# Patient Record
Sex: FEMALE | Race: WHITE | NOT HISPANIC OR LATINO | URBAN - METROPOLITAN AREA
[De-identification: names, ages, dates, MRNs, and addresses within clinical notes are randomized per-mention and may not be internally consistent; named-entity substitution may affect disease eponyms.]

---

## 2017-08-02 ENCOUNTER — TRANSCRIBE ORDERS (OUTPATIENT)
Dept: ADMINISTRATIVE | Facility: HOSPITAL | Age: 62
End: 2017-08-02

## 2017-08-02 DIAGNOSIS — E83.119 DILATED CARDIOMYOPATHY SECONDARY TO HEMOCHROMATOSIS (HCC): Primary | ICD-10-CM

## 2017-08-02 DIAGNOSIS — I43 DILATED CARDIOMYOPATHY SECONDARY TO HEMOCHROMATOSIS (HCC): Primary | ICD-10-CM

## 2017-08-02 DIAGNOSIS — Z80.0 FAMILY HISTORY OF MALIGNANT NEOPLASM OF GASTROINTESTINAL TRACT: ICD-10-CM

## 2017-08-09 ENCOUNTER — HOSPITAL ENCOUNTER (OUTPATIENT)
Dept: RADIOLOGY | Facility: HOSPITAL | Age: 62
Discharge: HOME | End: 2017-08-09
Payer: COMMERCIAL

## 2017-08-09 DIAGNOSIS — I43 DILATED CARDIOMYOPATHY SECONDARY TO HEMOCHROMATOSIS (HCC): ICD-10-CM

## 2017-08-09 DIAGNOSIS — Z80.0 FAMILY HISTORY OF MALIGNANT NEOPLASM OF GASTROINTESTINAL TRACT: ICD-10-CM

## 2017-08-09 DIAGNOSIS — E83.119 DILATED CARDIOMYOPATHY SECONDARY TO HEMOCHROMATOSIS (HCC): ICD-10-CM

## 2017-08-09 PROCEDURE — 76700 US EXAM ABDOM COMPLETE: CPT

## 2022-08-10 ENCOUNTER — TELEPHONE (OUTPATIENT)
Dept: PSYCHIATRY | Facility: CLINIC | Age: 67
End: 2022-08-10

## 2022-08-10 NOTE — TELEPHONE ENCOUNTER
Behavorial Health Outpatient Intake Questions    Referred by: family       Please advised interviewee that they need to answer all questions truthfully to allow for best care and any misrepresentations of information may affect their ability to be seen at this clinic   => Was this discussed? Yes     [facility]     Presenting Problem (in patient's words):   Medication concerns     Are there any developmental disabilities? ? If yes, can they speak to you on the phone? If they are too limited to speak to you on phone, refer out No    Are you taking any psychiatric medications? Yes    => If yes, who prescribes? If yes, are they injectable medications?   citalopam ativan =Sarah Hayes     Does the patient have a language barrier or hearing impairment? No    Have you been treated at [facility] by a therapist or a doctor in the past? If yes, who? No    Has the patient been hospitalized for mental health? No   If yes, how long ago was last hospitalization and where was it? Do you actively use alcohol or marijuana or illegal substances? If yes, what and how much - refer out to Drug and alcohol treatment if use is excessive or daily use of illegal substances No concerns of substance abuse are reported  Do you have a community treatment team or ? No    Legal History-     Does the patient have any history of arrests, half-way/nursing home time, or DUIs? No  If Yes-  1) What types of charges? 2) When were they last incarcerated? 3) Are they currently on parole or probation? Minor Child-    Who has custody of the child? Is there a custody agreement? If there is a custody agreement remind parent that they must bring a copy to the first appt or they will not be seen       Intake Team, please check with provider before scheduling if flags come up such as:  - complex case  - legal history (other than DUI)  - communication barrier concerns are present  - if, in your judgment, this needs further review    ACCEPTED as a patient Yes  => Appointment Date: 08/1/22 @ 3     Referred Elsewhere? No    Name of [address] Phone #  If ins is primary or secondary  If patient is a minor, parents information such as Name, D  O B of guarantor    Mariajose Supplement   ID# [id number]

## 2022-08-11 ENCOUNTER — OFFICE VISIT (OUTPATIENT)
Dept: PSYCHIATRY | Facility: CLINIC | Age: 67
End: 2022-08-11
Payer: MEDICARE

## 2022-08-11 DIAGNOSIS — F33.1 MAJOR DEPRESSIVE DISORDER, RECURRENT, MODERATE (HCC): Primary | ICD-10-CM

## 2022-08-11 DIAGNOSIS — F41.1 GENERALIZED ANXIETY DISORDER WITH PANIC ATTACKS: ICD-10-CM

## 2022-08-11 DIAGNOSIS — F41.0 GENERALIZED ANXIETY DISORDER WITH PANIC ATTACKS: ICD-10-CM

## 2022-08-11 PROCEDURE — 90792 PSYCH DIAG EVAL W/MED SRVCS: CPT | Performed by: PSYCHIATRY & NEUROLOGY

## 2022-08-11 RX ORDER — LORAZEPAM 0.5 MG/1
0.5 TABLET ORAL 2 TIMES DAILY PRN
COMMUNITY
Start: 2022-06-28 | End: 2022-09-07 | Stop reason: ALTCHOICE

## 2022-08-11 RX ORDER — BUSPIRONE HYDROCHLORIDE 5 MG/1
TABLET ORAL
Qty: 106 TABLET | Refills: 0 | Status: SHIPPED | OUTPATIENT
Start: 2022-08-11 | End: 2022-08-30 | Stop reason: SDUPTHER

## 2022-08-11 RX ORDER — CITALOPRAM 20 MG/1
40 TABLET ORAL DAILY
COMMUNITY
Start: 2022-08-09 | End: 2022-09-02 | Stop reason: SDUPTHER

## 2022-08-11 NOTE — BH TREATMENT PLAN
TREATMENT PLAN (Medication Management Only)        [address] [place] [address]    Name and Date of Birth: Sreekanth Monk SFXU 42 y o  1955  Date of Treatment Plan: August 11, 2022  Diagnosis/Diagnoses:    1  Major depressive disorder, recurrent, moderate (HCC)    2  Generalized anxiety disorder with panic attacks      Strengths/Personal Resources for Self-Care: supportive family, taking medications as prescribed, ability to communicate needs, ability to listen, ability to understand psychiatric illness  Area/Areas of need (in own words): anxiety, depression  1  Long Term Goal: to be off the medications  Target Date:3 months - 11/11/2022  Person/Persons responsible for completion of goal: Adilia  2  Short Term Objective (s) - How will we reach this goal?:   A  Provider new recommended medication/dosage changes and/or continue medication(s): continue current medications as prescribed  B  N/A   C  N/A  Target Date:3 months - 11/11/2022  Person/Persons Responsible for Completion of Goal: Adilia  Progress Towards Goals: starting treatment  Treatment Modality: medication management every 4 weeks  Review due 180 days from date of this plan: 6 months - 2/11/2023  Expected length of service: ongoing treatment  My Physician/PA/NP and I have developed this plan together and I agree to work on the goals and objectives  I understand the treatment goals that were developed for my treatment

## 2022-08-11 NOTE — PSYCH
[address] [facility]    Name and Date of Birth: Elijah Dang JXNP 70 y o  1955 MRN: [id number]    Date of Visit: August 11, 2022    Source of Information:  Patient herself who seems to be okay historian  Her medical records in the [facility] was also reviewed  Chief Complaint:  Anxiety and depression    HPI:  This is a 71-year-old  female, , has 3 grownup children, currently lives with her  in [place], [place]  The patient currently works at a gift store as a  in [place], [place]  The patient reports she 1st saw a psychiatrist when she was 44-year-old for depression after her 3month-old son passed away  She reports she has followed with few psychiatrist on and off till now  She reports her last psychiatric provider was Florence Stark who is a physician assistant at [facility]  She reports she followed with her for almost 10 years  The patient reports she had been diagnosed with anxiety and depression in the past   She also reported being in counseling in the past but not nowadays  She reports she had recurrent episodes of depression and anxiety in the past   She reports though her anxiety since last 5-6 months has been getting worse  She reports being on Celexa for many years and was very effective but since early this year she feels it affects is weaning off  She reports her previous provider cross tapered her on Paxil recently but she felt very nauseated on it and stop taking Paxil  Currently takes Celexa 40 mg daily  She also is on Ativan 0 5 mg up to 2 times a day as needed and was started few months back  She reports in the past she had been on Wellbutrin though it was not effective  She reports she was also recommended to consider Abilify which she never started taking    She was advised to take nortriptyline recently after she felt nauseated on the Paxil but never started it also  Patient connected with us for her medication management and came in today for her initial psychiatric evaluation  As mentioned above the patient reports she has been feeling very anxious over last 5-6 months  She though denies any acute stressor but reports her mom is 78-year-old and needs lot of help which can be stressful at times  She also reported her daughter's  was diagnosed with lymphoma early this year though he has been doing better now  She denies though any other stress  She reports she is not sure why she has been so anxious lately  Reports her anxiety is worse in the morning and as the day progresses it gets better  Reports taking Ativan in the morning after she wakes up and 2nd dose in the evening  She described her anxiety as feeling like losing control over things, not feeling calm, feeling afraid of something but could not pinpoint  She reports occasional panic attack over last few months including feeling shaky, racing heart and difficulty breathing  Denies any social anxiety but does not like to be around people  The patient reports she has struggle with anxiety in the past but had been mostly situational and had not last so long  Reports Celexa was very effective for anxiety in the past but not nowadays  The patient denies any history of abuse or trauma ever in her life except for losing child  The patient also reports she has been feeling very sad over last few months but feels it is due to underlying anxiety  Reports feeling hopeless and thinking that anxiety would never get better  Reports still working but have poor motivation  Denies any suicidal thoughts or any thoughts or urges to harm others  Denies any history of any suicide attempt ever in the past   Reports sleep has been overall good  Though reports poor appetite and energy level  Reports focus also has been very poor  Endorses anhedonia  Reports not able to enjoy anything nowadays  Reports good support from her  and children  Reports had similar episodes in the past couple of times last time was in 2011 after her daughter moved away to different state  She though reports it only lasted for few months  Denies any history of auditory or visual hallucination  Does not endorse any paranoia, delusional or grandiose ideation  Denies any history of any manic or hypomanic episode ever in the past   Denies any history of eating disorder  Review Of Systems:    Constitutional negative   ENT negative   Cardiovascular negative   Respiratory negative   Gastrointestinal negative   Genitourinary negative   Musculoskeletal negative   Integumentary negative   Neurological negative   Endocrine negative   Other Symptoms none       Past Psychiatric History:  Check HPI for detail  Traumatic History:     Abuse: none  Other Traumatic Events: none       Substance Abuse History:  The patient denies any alcohol or substance use  Reports used to drink alcohol occasionally but not nowadays  Longest clean time: not applicable  History of Inpatient/Outpatient rehabilitation program: no  Smoking history: denies use  Use of caffeine: 1/2 cups of coffee per day    Family Psychiatric/Substance Use History:     Psychiatric Illness:   Mother - depression and anxiety disorder, Sister - depression and anxiety disorder, Sister - depression and anxiety disorder  Substance Abuse:  patient denies  Suicide Attempts:  patient denies    Social History:  Born & Raised in :  [place]  Childhood Experiences:  Good  Education: bachelor's degree  Learning Disabilities: none  Marital History:   Children: 3 adult children  Living Arrangement: lives in home with   Occupational History: works at [address] [employer]: good support system  Legal History: none   History: None      Past Medical History:    Past Medical History:   Diagnosis Date    Hemochromatosis History reviewed  No pertinent surgical history  Allergies   Allergen Reactions    Sulfa Antibiotics Hives and Other (See Comments)     hives    Ciprofloxacin Rash     Other reaction(s): rash  Other reaction(s): rash           Current Medications:      Current Outpatient Medications:     busPIRone (BUSPAR) 5 mg tablet, Take 1 tablet (5 mg total) by mouth 3 (three) times a day for 14 days, THEN 2 tablets (10 mg total) 2 (two) times a day for 16 days  , Disp: 106 tablet, Rfl: 0    Cholecalciferol 25 MCG (1000 UT) capsule, Take 5,000 Units by mouth daily, Disp: , Rfl:     citalopram (CeleXA) 20 mg tablet, Take 40 mg by mouth in the morning, Disp: , Rfl:     LORazepam (ATIVAN) 0 5 mg tablet, Take 0 5 mg by mouth 2 (two) times a day as needed, Disp: , Rfl:        OBJECTIVE:    Vital signs in last 24 hours: There were no vitals filed for this visit      Mental Status Evaluation:    Appearance age appropriate, casually dressed   Behavior cooperative, calm   Speech normal rate, normal volume, normal pitch   Mood depressed, anxious   Affect tearful, mood-congruent   Thought Processes organized, goal directed   Associations intact associations   Thought Content no overt delusions   Perceptual Disturbances: no auditory hallucinations, no visual hallucinations   Abnormal Thoughts  Risk Potential Suicidal ideation - None  Homicidal ideation - None  Potential for aggression - No   Orientation oriented to person, place, time/date and situation   Memory recent and remote memory grossly intact   Consciousness alert and awake   Attention Span Concentration Span attention span and concentration are age appropriate   Intellect appears to be of average intelligence   Insight intact   Judgement intact   Muscle Strength and  Gait normal gait and normal balance   Motor Activity no abnormal movements   Language no difficulty naming common objects, no difficulty repeating a phrase   Fund of Knowledge adequate knowledge of current events  adequate fund of knowledge regarding past history  adequate fund of knowledge regarding vocabulary    Pain none   Pain Scale 0       Laboratory Results: Most Recent Labs:  Reviewed in epic    Assessment/Plan:  From evaluation of the patient today and review of her past psychiatric history, I believe patient meets the criteria for major depressive disorder, recurrent currently moderate and generalized anxiety disorder with panic attack  It seems patient struggle significantly with anxiety which led to her feeling depressed  The patient does not endorse any symptoms or meets criteria for any other mental health disorder at this time  No alcohol or substance abuse history  Has a very good support from her family  Is also future oriented  Though feeling currently hopeless due to her significant anxiety  No SI or HI neither has any history of any suicide attempt ever in the past       Plan:  Reviewed with the patient various pharmacological and nonpharmacological treatment options  I reviewed with patient considering either adding Abilify or BuSpar to Celexa or switching Celexa to SNRI such as Effexor for management of anxiety and depression  The patient is concerned about being on Abilify as she feels it might decrease her white blood cell count  The patient last blood work count showed normal WBC though it was below normal range in the past   The patient agrees to takes BuSpar  I will start the patient on BuSpar 5 mg 3 times a day for next 2 weeks and then increasing to 10 mg twice a day till she sees me again in 4 weeks  I will continue with Celexa 40 mg 1 tablet daily with no changes for anxiety and depression  The patient has been advised to take Ativan 0 5 mg only 1 tablet a day if needed for severe anxiety or panic attack otherwise to avoid given the long-term risk of the benzodiazepines    The patient educated about her medications in detail including benefit, risk, side effects, alternative, contraindication, dosage and frequency  She was specially educated about risk of serotonin syndrome and its symptoms and advised to call me if there is any concern or to call crisis or visit nearby ER  She also was advised to call crisis or visit nearby ER in case of worsening depression or having any SI or HI or any emergency  The patient agrees  I will also send a referral to connect the patient with psychotherapist   The patient was in agreement with the plan  Diagnoses and all orders for this visit:    Major depressive disorder, recurrent, moderate (HCC)    Generalized anxiety disorder with panic attacks  -     busPIRone (BUSPAR) 5 mg tablet; Take 1 tablet (5 mg total) by mouth 3 (three) times a day for 14 days, THEN 2 tablets (10 mg total) 2 (two) times a day for 16 days  Other orders  -     Cholecalciferol 25 MCG (1000 UT) capsule; Take 5,000 Units by mouth daily  -     citalopram (CeleXA) 20 mg tablet; Take 40 mg by mouth in the morning  -     LORazepam (ATIVAN) 0 5 mg tablet; Take 0 5 mg by mouth 2 (two) times a day as needed          Treatment Recommendations:    Check Assessment/Plan section for details  Risks/Benefits/Precautions:      Risks, Benefits And Possible Side Effects Of Medications:    Risks, benefits, and possible side effects of medications explained to Dank Gallo and she verbalizes understanding and agreement for treatment  Controlled Medication Discussion:     Dank Gallo has been filling controlled prescriptions on time as prescribed according to [facility]  Discussed with Dank Sabino the risks of sedation, respiratory depression, impairment of ability to drive and potential for abuse and addiction related to treatment with benzodiazepine medications   She understands risk of treatment with benzodiazepine medications, agrees to not drive if feels impaired and agrees to take medications as prescribed  Discussed with Charles Humphrey warning on concurrent use of benzodiazepines and opioid medications including sedation, respiratory depression, coma and death  She understands the risk of treatment with benzodiazepines in addition to opioids and wants to continue taking those medications  Discussed with Bharti Wilkerson increased risk of impairment related to concurrent use of benzodiazepines and hypnotic medications including excessive sedation, psychomotor impairment and respiratory depression   She understands the risk of treatment with benzodiazepines in addition to hypnotic medications and wants to continue taking those medications    Treatment Plan;    Completed and signed during the session: Yes - with Dustin Astudillo MD 08/11/22

## 2022-08-18 ENCOUNTER — TELEPHONE (OUTPATIENT)
Dept: PSYCHIATRY | Facility: CLINIC | Age: 67
End: 2022-08-18

## 2022-08-18 NOTE — TELEPHONE ENCOUNTER
Returned Adilia's call  Hildaey Gottron is not having any side effects from the Buspar  Tomorrow will be a week that she is on 5 MG TID  She wants to know if she has to wait the full 2 weeks for the increase or can she increase now? Will refer to Dr Alee Guevara for review

## 2022-08-30 ENCOUNTER — TELEPHONE (OUTPATIENT)
Dept: PSYCHIATRY | Facility: CLINIC | Age: 67
End: 2022-08-30

## 2022-08-30 DIAGNOSIS — F41.0 GENERALIZED ANXIETY DISORDER WITH PANIC ATTACKS: ICD-10-CM

## 2022-08-30 DIAGNOSIS — F41.1 GENERALIZED ANXIETY DISORDER WITH PANIC ATTACKS: ICD-10-CM

## 2022-08-30 RX ORDER — BUSPIRONE HYDROCHLORIDE 10 MG/1
10 TABLET ORAL 3 TIMES DAILY
Qty: 90 TABLET | Refills: 0 | Status: SHIPPED | OUTPATIENT
Start: 2022-08-30 | End: 2022-09-26 | Stop reason: SDUPTHER

## 2022-08-30 NOTE — TELEPHONE ENCOUNTER
Returned Adilia's call  She is not feeling any better  Anxiety is still very high and she doesn't even want to get out of bed in the morning because of it  She is currently taking 10 MG of Buspar twice a day and does not feel it is doing anything  Also questioned the referral for talk therapy  Has not heard anything yet  Is requesting providers recommendation  Will refer to Dr Wilfredo Bansal for review

## 2022-08-30 NOTE — TELEPHONE ENCOUNTER
Spoke to Reji Anderson  Instructed her to allow Buspar more time to work but informed her the Dr Christian Fletcher increased her dose to 10 MG three times a day  She will finish off her 5 mg tablets by taking 2 TID and will discuss further with provider at upcoming appointment on 9/8

## 2022-08-30 NOTE — PROGRESS NOTES
The patient called today and complained of significant anxiety and feels current dose of BuSpar not helpful  The medication was just started 20 days back and can take some time but I will increase the dose of BuSpar to 10 milligram 1 tablet 3 times a day for management of anxiety  Her other meds will be continued with no change at this time but on follow-up visit next week will consider other alternative to Celexa if the patient still does not feel well  Patient was updated about the plan through nurse [facility]  Patient was made aware of benefit versus side effect, risk and other alternative at last visit  She was specially educated about serotonin syndrome at that time and its symptoms and was advised to call us if there is any concern or to call crisis or visit nearby ER

## 2022-09-02 DIAGNOSIS — F33.1 MAJOR DEPRESSIVE DISORDER, RECURRENT, MODERATE (HCC): ICD-10-CM

## 2022-09-02 DIAGNOSIS — F41.0 GENERALIZED ANXIETY DISORDER WITH PANIC ATTACKS: Primary | ICD-10-CM

## 2022-09-02 DIAGNOSIS — F41.1 GENERALIZED ANXIETY DISORDER WITH PANIC ATTACKS: Primary | ICD-10-CM

## 2022-09-02 RX ORDER — CITALOPRAM 20 MG/1
40 TABLET ORAL DAILY
Qty: 60 TABLET | Refills: 0 | Status: SHIPPED | OUTPATIENT
Start: 2022-09-02 | End: 2022-09-26 | Stop reason: SDUPTHER

## 2022-09-07 ENCOUNTER — TELEMEDICINE (OUTPATIENT)
Dept: PSYCHIATRY | Facility: CLINIC | Age: 67
End: 2022-09-07
Payer: MEDICARE

## 2022-09-07 ENCOUNTER — TELEPHONE (OUTPATIENT)
Dept: PSYCHIATRY | Facility: CLINIC | Age: 67
End: 2022-09-07

## 2022-09-07 DIAGNOSIS — F41.1 GENERALIZED ANXIETY DISORDER WITH PANIC ATTACKS: Primary | ICD-10-CM

## 2022-09-07 DIAGNOSIS — F41.0 GENERALIZED ANXIETY DISORDER WITH PANIC ATTACKS: Primary | ICD-10-CM

## 2022-09-07 DIAGNOSIS — F33.1 MAJOR DEPRESSIVE DISORDER, RECURRENT, MODERATE (HCC): ICD-10-CM

## 2022-09-07 PROCEDURE — 99214 OFFICE O/P EST MOD 30 MIN: CPT | Performed by: PSYCHIATRY & NEUROLOGY

## 2022-09-07 PROCEDURE — 90833 PSYTX W PT W E/M 30 MIN: CPT | Performed by: PSYCHIATRY & NEUROLOGY

## 2022-09-07 RX ORDER — CLONAZEPAM 0.5 MG/1
0.5 TABLET ORAL
Qty: 30 TABLET | Refills: 0 | Status: SHIPPED | OUTPATIENT
Start: 2022-09-07 | End: 2022-09-26

## 2022-09-07 NOTE — PSYCH
MEDICATION MANAGEMENT NOTE        [facility]      Name and Date of Birth: Jc Anand TIQT 71 y o  1955 MRN: [id number]    Date of Visit: September 7, 2022    Reason for Visit:  Follow-up for medication management    Subjective: The patient reports she still has been significantly struggling with anxiety  Reports it is bad specially when she wakes up in the morning  Feels chest tightness and at times burning sensation  Reports also feeling scared and anxious but denies any acute or any specific stressor or trigger  Denies it is GERD  Reports occasional panic attack  Reports though her anxiety usually gets better as the day progresses  Reports taking Ativan mostly only once a day in the morning  The patient has been taking BuSpar and had called us few weeks back and wanted to change in the dose and was increased to 10 mg 3 times a day  The patient reported recently she increased herself the morning dose from 10 mg to 15 mg but has been taking 10 mg in the afternoon and and at night  Feels it has not helped at all yet  The patient also taking Celexa  She denies any side effects on the medication  Denies any symptoms for serotonin syndrome  Reports her sleep has been good despite of anxiety  Reports appetite has been okay  Still continues to feel depressed secondary to anxiety  Reports not able to enjoy doing anything  Though denies any suicidal thoughts  Reports has her family being major protective factor  Denies any history of attempt  Denies any thoughts to hurt herself  Denies any other concern  Reports she did blood work recently on recommendation of her other prescriber and her cortisol level was elevated  She was told it is secondary to anxiety  The patient was worried about it  Her concerns were validated  Supportive psychotherapy provided for almost 20 minutes during the meeting    Education about medication and other alternatives were also discussed in detail  Review Of Systems:      Constitutional negative   ENT negative   Cardiovascular negative   Respiratory negative   Gastrointestinal negative   Genitourinary negative   Musculoskeletal negative   Integumentary negative   Neurological negative   Endocrine negative   Other Symptoms none       Alcohol/Substance Abuse:  Denies  The patient also made aware about risk of alcohol or any substances with Klonopin and to avoid drinking alcohol or use other substances  Past Medical History:    Past Medical History:   Diagnosis Date    Hemochromatosis         No past surgical history on file  Allergies   Allergen Reactions    Sulfa Antibiotics Hives and Other (See Comments)     hives    Ciprofloxacin Rash     Other reaction(s): rash  Other reaction(s): rash         Current Medications:       Current Outpatient Medications:     busPIRone (BUSPAR) 10 mg tablet, Take 1 tablet (10 mg total) by mouth 3 (three) times a day (Patient taking differently: Take 10 mg by mouth 3 (three) times a day 15 mg qam, 10 mg q afternoon and 10 mg in evening), Disp: 90 tablet, Rfl: 0    clonazePAM (KlonoPIN) 0 5 mg tablet, Take 1 tablet (0 5 mg total) by mouth daily at bedtime, Disp: 30 tablet, Rfl: 0    Cholecalciferol 25 MCG (1000 UT) capsule, Take 5,000 Units by mouth daily, Disp: , Rfl:     citalopram (CeleXA) 20 mg tablet, Take 2 tablets (40 mg total) by mouth in the morning, Disp: 60 tablet, Rfl: 0       History Review: The following portions of the patient's history were reviewed and updated as appropriate: allergies, current medications, past family history, past medical history, past social history, past surgical history and problem list          OBJECTIVE:     Vital signs in last 24 hours: There were no vitals filed for this visit      Mental Status Evaluation:    Appearance age appropriate, casually dressed   Behavior cooperative, calm   Speech normal rate, normal volume, normal pitch Mood depressed, anxious   Affect constricted   Thought Processes organized, goal directed   Associations intact associations   Thought Content no overt delusions   Perceptual Disturbances: no auditory hallucinations, no visual hallucinations   Abnormal Thoughts  Risk Potential Suicidal ideation - None  Homicidal ideation - None  Potential for aggression - No   Orientation oriented to person, place, time/date and situation   Memory recent and remote memory grossly intact   Consciousness alert and awake   Attention Span Concentration Span attention span and concentration are age appropriate   Intellect appears to be of average intelligence   Insight intact   Judgement intact   Muscle Strength and  Gait unable to assess today due to virtual visit   Motor activity unable to assess today due to virtual visit   Language no difficulty naming common objects, no difficulty repeating a phrase   Fund of Knowledge adequate knowledge of current events  adequate fund of knowledge regarding past history  adequate fund of knowledge regarding vocabulary    Pain none   Pain Scale 0       Laboratory Results: Most Recent Labs: I have personally reviewed all pertinent laboratory/tests results  Assessment/Plan:  Patient continues struggle with significant anxiety specially in the morning  Secondary depression also present to anxiety  no SI or HI  The plan is to discontinue Ativan and will add Klonopin 0 5 mg 1 tablet by mouth daily at bedtime for management of anxiety  The patient has been advised if she feels medication slightly helping but still anxiety present to call me in couple of weeks and I can go up on the Klonopin dose to 1 mg at bedtime  She was advised not to take Ativan anymore  At this time given her severe anxiety feel the benefit of the medication is more than the risk  Patient also advised to take Celexa at bedtime rather than in the morning for anxiety and depression    Continue with BuSpar at this time with no change but over next for 2-3 weeks if the patient tolerating the medication well will increase to 15 mg 3 times a day  I can also consider over next visit if the patient still does not feel any difference to switching Celexa to Effexor or other alternative  The patient educated about her psychiatric medication in detail including benefit, risk, side effects, dosage, frequency, alternative and contraindication  She was specially advised about Klonopin risk including fall and drowsiness and in case she wakes up in the middle of night to be careful specially while walking  She also was educated about serotonin syndrome and it symptom and advised to call us or to call crisis, 911 or  taking her to nearby ER to get further evaluation if she noticed any symptoms for it  The patient will follow-up with me in 4 weeks or sooner if needed  She was advised to call us if there is any concern and to call crisis or visit nearby ER in case of any emergency  The patient verbalized understanding agrees with the plan  I will also again send referral to get patient connected with psychotherapist       Diagnoses and all orders for this visit:    Generalized anxiety disorder with panic attacks  -     clonazePAM (KlonoPIN) 0 5 mg tablet; Take 1 tablet (0 5 mg total) by mouth daily at bedtime    Major depressive disorder, recurrent, moderate (HCC)          Treatment Recommendations/Precautions:      Aware of 24 hour and weekend coverage for urgent situations accessed by calling [facility] main practice number    Risks/Benefits      Risks, Benefits And Possible Side Effects Of Medications:    Risks, benefits, and possible side effects of medications explained to Dimitry Sánchze and she verbalizes understanding and agreement for treatment      Controlled Medication Discussion:     Dimitry Sánchez has been filling controlled prescriptions on time as prescribed according to [facility]  Discussed with Lavonne Fong the risks of sedation, respiratory depression, impairment of ability to drive and potential for abuse and addiction related to treatment with benzodiazepine medications  She understands risk of treatment with benzodiazepine medications, agrees to not drive if feels impaired and agrees to take medications as prescribed  Discussed with Abdi Humphrey warning on concurrent use of benzodiazepines and opioid medications including sedation, respiratory depression, coma and death  She understands the risk of treatment with benzodiazepines in addition to opioids and wants to continue taking those medications  Discussed with Lavonne Fong increased risk of impairment related to concurrent use of benzodiazepines and hypnotic medications including excessive sedation, psychomotor impairment and respiratory depression  She understands the risk of treatment with benzodiazepines in addition to hypnotic medications and wants to continue taking those medications    Psychotherapy Provided:     Individual psychotherapy provided: Counseling was provided during the session today for 20 minutes  Medications, treatment progress and treatment plan reviewed with Lavonne Fong  Medication changes discussed with Lavonne Fong  Medication education provided to Lavonne Fong  Reassurance and supportive therapy provided  Crisis/safety plan discussed with Lavonne Fong       Treatment Plan;    Completed and signed during the session: Not applicable - Treatment Plan not due at this session    Yasmin Cormier MD 09/07/22

## 2022-09-07 NOTE — TELEPHONE ENCOUNTER
Called and lvm advising patient to return call to Intake Dept at [phone]     ----- Message from Julian Montague MD sent at 9/7/2022 10:19 AM EDT -----  Regarding: referral for therapy  Good Morning: I am not sure if I had send referral for her 4 weeks back  Please connect her with therapist  This is urgent   Thanks

## 2022-09-08 ENCOUNTER — TELEPHONE (OUTPATIENT)
Dept: PSYCHIATRY | Facility: CLINIC | Age: 67
End: 2022-09-08

## 2022-09-08 NOTE — TELEPHONE ENCOUNTER
Called patient and gave her your message      Patient understood and will call the office if there is no change

## 2022-09-13 ENCOUNTER — TELEPHONE (OUTPATIENT)
Dept: PSYCHIATRY | Facility: CLINIC | Age: 67
End: 2022-09-13

## 2022-09-13 NOTE — TELEPHONE ENCOUNTER
Per conversation with Susana Hoffmann, she has been on Buspar for 5 weeks and feels as tough it is not working  She has increased her dose to 20 MG BID  States that next week will be 6 weeks and she would like to know now what the plan is for next week  Told writer that she does not want to go 6 weeks and then have to wait until next appointment to address medication changes  Feels as though medications increases are being addressed too slow and she cannot take much more of feeling the way she is  States that Citalopram may not be working and wants to know what the plan is with that  (Her sister is going through the same thing and was switched from Citalopram to Cymbalta  States she is doing well with switch)  Susana Hoffmann also does not like the Klonopin  Has taken it 4 times already and states all it does is make her drowsy  States she almost fell after taking it  She is requesting to be updated with what next step in her treatment will be  Will refer to Dr Yee Ortez for review

## 2022-09-14 NOTE — TELEPHONE ENCOUNTER
Spoke to Vielka  Discussed trying to take Klonopin at increased dose of 1 MG at bedtime as per Dr Geovanni Menchaca  Instructed her to get up slowly to prevent dizziness caused by rapid movements  Also discussed allowing Buspar more time and that any medication changes or additions will be discussed at upcoming appointment  Adilia understands but would like to possibly have her appointment moved up a little if possible  She will call the office to check for earlier availability

## 2022-09-26 ENCOUNTER — TELEMEDICINE (OUTPATIENT)
Dept: PSYCHIATRY | Facility: CLINIC | Age: 67
End: 2022-09-26
Payer: MEDICARE

## 2022-09-26 DIAGNOSIS — F41.0 GENERALIZED ANXIETY DISORDER WITH PANIC ATTACKS: ICD-10-CM

## 2022-09-26 DIAGNOSIS — F33.1 MAJOR DEPRESSIVE DISORDER, RECURRENT, MODERATE (HCC): ICD-10-CM

## 2022-09-26 DIAGNOSIS — F41.1 GENERALIZED ANXIETY DISORDER WITH PANIC ATTACKS: ICD-10-CM

## 2022-09-26 PROCEDURE — 99214 OFFICE O/P EST MOD 30 MIN: CPT | Performed by: PSYCHIATRY & NEUROLOGY

## 2022-09-26 RX ORDER — BUSPIRONE HYDROCHLORIDE 10 MG/1
20 TABLET ORAL 2 TIMES DAILY
Qty: 120 TABLET | Refills: 1 | Status: SHIPPED | OUTPATIENT
Start: 2022-09-26 | End: 2022-10-18

## 2022-09-26 RX ORDER — CLONAZEPAM 0.5 MG/1
TABLET ORAL
Qty: 90 TABLET | Refills: 1 | Status: SHIPPED | OUTPATIENT
Start: 2022-09-26 | End: 2022-10-26

## 2022-09-26 RX ORDER — CITALOPRAM 20 MG/1
20 TABLET ORAL DAILY
Qty: 7 TABLET | Refills: 0 | Status: SHIPPED | OUTPATIENT
Start: 2022-09-26 | End: 2022-10-26 | Stop reason: ALTCHOICE

## 2022-09-26 RX ORDER — DULOXETIN HYDROCHLORIDE 20 MG/1
CAPSULE, DELAYED RELEASE ORAL
Qty: 53 CAPSULE | Refills: 0 | Status: SHIPPED | OUTPATIENT
Start: 2022-09-26 | End: 2022-10-18

## 2022-09-26 NOTE — PSYCH
Virtual Regular Visit    Verification of patient location:    Patient is located in the following state in which I hold an active license [place]      Assessment/Plan:    Problem List Items Addressed This Visit        Other    Major depressive disorder, recurrent, moderate (HCC)    Relevant Medications    citalopram (CeleXA) 20 mg tablet    DULoxetine (CYMBALTA) 20 mg capsule    busPIRone (BUSPAR) 10 mg tablet    Generalized anxiety disorder with panic attacks    Relevant Medications    citalopram (CeleXA) 20 mg tablet    clonazePAM (KlonoPIN) 0 5 mg tablet    DULoxetine (CYMBALTA) 20 mg capsule    busPIRone (BUSPAR) 10 mg tablet          Goals addressed in session: Goal 1 and Goal 2          Reason for visit is   Chief Complaint   Patient presents with    Virtual Regular Visit        Encounter provider Rob Martínez MD    Provider located at [address] [address]      Recent Visits  No visits were found meeting these conditions  Showing recent visits within past 7 days and meeting all other requirements  Today's Visits  Date Type Provider Dept   09/26/22 Telemedicine Rob Martínez MD [place] [id number] today's visits and meeting all other requirements  Future Appointments  No visits were found meeting these conditions  Showing future appointments within next 150 days and meeting all other requirements       The patient was identified by name and date of birth  [place] Self was informed that this is a telemedicine visit and that the visit is being conducted throughic Embedded and patient was informed this is a secure, HIPAA-complaint platform  She agrees to proceed     My office door was closed  No one else was in the room  She acknowledged consent and understanding of privacy and security of the video platform   The patient has agreed to participate and understands they can discontinue the visit at any time  Patient is aware this is a billable service  Subjective  See below       HPI     Past Medical History:   Diagnosis Date    Hemochromatosis        No past surgical history on file  Current Outpatient Medications   Medication Sig Dispense Refill    busPIRone (BUSPAR) 10 mg tablet Take 2 tablets (20 mg total) by mouth 2 (two) times a day 120 tablet 1    citalopram (CeleXA) 20 mg tablet Take 1 tablet (20 mg total) by mouth in the morning for 7 days Then discontinue 7 tablet 0    clonazePAM (KlonoPIN) 0 5 mg tablet Take one tab (0 5 mg) by mouth in morning and 2 tabs (1 mg) in evening 90 tablet 1    DULoxetine (CYMBALTA) 20 mg capsule Take 1 capsule (20 mg total) by mouth daily for 7 days, THEN 2 capsules (40 mg total) daily for 23 days  53 capsule 0    Cholecalciferol 25 MCG (1000 UT) capsule Take 5,000 Units by mouth daily       No current facility-administered medications for this visit  Allergies   Allergen Reactions    Sulfa Antibiotics Hives and Other (See Comments)     hives    Ciprofloxacin Rash     Other reaction(s): rash  Other reaction(s): rash         Review of Systems    Video Exam    There were no vitals filed for this visit  Physical Exam     I spent 26 minutes directly with the patient during this visit        [address] [place] [address]      Name and Date of Birth: Hammad Freitas KIM 4 y o  1955 MRN: [id number]    Date of Visit: September 26, 2022    Reason for Visit:  Follow-up for medication management    Subjective: The patient reports she does not feel any different since last visit  She reports her anxiety still very high specially was in the morning  She also reports she has been feeling very depressed secondary to anxiety  Reports crying easily, have no motivation, and goes to work but feels not focused  Denies any suicidal thoughts or any urges to hurt others    Reports she has been taking higher dose of Klonopin than prescribed  She had called us few weeks after last visit and at that time she was recommended to take Klonopin from 0 5 mg at bedtime to 1 mg at bedtime  Though the patient reported she also started taking Klonopin 0 5 mg in the morning in addition to at bedtime  The patient denies though taking Ativan anymore  She though reports she still did not felt any difference  The patient also had talked to us few weeks back wanted to switch from Celexa to other alternative such as Cymbalta as she felt her sister did well on it  She is okay with switch of her medication today  The patient was advised that cross tapering can take some time though patient wanted the switch more sooner  Due to which quick cross taper from Celexa to Cymbalta will be done  The patient though denies any side effects on her current medication  Denies any symptoms of serotonin syndrome and was again educated about it in detail and advised to call crisis or 911 if she experience any symptoms of serotonin syndrome  The patient denies ever being on Cymbalta  Denies any history of hypertension or any cardiac issues  Denies any history of seizure  Review Of Systems:      Constitutional negative   ENT negative   Cardiovascular negative   Respiratory negative   Gastrointestinal negative   Genitourinary negative   Musculoskeletal negative   Integumentary negative   Neurological negative   Endocrine negative   Other Symptoms none       Alcohol/Substance Abuse:  Denies        Past Medical History:    Past Medical History:   Diagnosis Date    Hemochromatosis         No past surgical history on file    Allergies   Allergen Reactions    Sulfa Antibiotics Hives and Other (See Comments)     hives    Ciprofloxacin Rash     Other reaction(s): rash  Other reaction(s): rash         Current Medications:       Current Outpatient Medications:     busPIRone (BUSPAR) 10 mg tablet, Take 2 tablets (20 mg total) by mouth 2 (two) times a day, Disp: 120 tablet, Rfl: 1    citalopram (CeleXA) 20 mg tablet, Take 1 tablet (20 mg total) by mouth in the morning for 7 days Then discontinue, Disp: 7 tablet, Rfl: 0    clonazePAM (KlonoPIN) 0 5 mg tablet, Take one tab (0 5 mg) by mouth in morning and 2 tabs (1 mg) in evening, Disp: 90 tablet, Rfl: 1    DULoxetine (CYMBALTA) 20 mg capsule, Take 1 capsule (20 mg total) by mouth daily for 7 days, THEN 2 capsules (40 mg total) daily for 23 days  , Disp: 53 capsule, Rfl: 0    Cholecalciferol 25 MCG (1000 UT) capsule, Take 5,000 Units by mouth daily, Disp: , Rfl:        History Review: The following portions of the patient's history were reviewed and updated as appropriate: allergies, current medications, past family history, past medical history, past social history, past surgical history and problem list          OBJECTIVE:     Vital signs in last 24 hours: There were no vitals filed for this visit      Mental Status Evaluation:    Appearance age appropriate, casually dressed   Behavior cooperative, calm   Speech normal rate, normal volume, normal pitch   Mood depressed, anxious   Affect tearful   Thought Processes organized, goal directed   Associations intact associations   Thought Content no overt delusions   Perceptual Disturbances: no auditory hallucinations, no visual hallucinations   Abnormal Thoughts  Risk Potential Suicidal ideation - None  Homicidal ideation - None  Potential for aggression - No   Orientation oriented to person, place, time/date and situation   Memory recent and remote memory grossly intact   Consciousness alert and awake   Attention Span Concentration Span attention span and concentration are age appropriate   Intellect appears to be of average intelligence   Insight intact   Judgement intact   Muscle Strength and  Gait normal gait and normal balance   Motor activity no abnormal movements   Language no difficulty naming common objects, no difficulty repeating a phrase   Fund of Knowledge adequate knowledge of current events  adequate fund of knowledge regarding past history  adequate fund of knowledge regarding vocabulary    Pain none   Pain Scale 0       Laboratory Results: Most Recent Labs: I have personally reviewed all pertinent laboratory/tests results  Assessment/Plan:  The patient continues to struggle with significant anxiety and depression  No acute trigger reported but feels affecting job  Wants changes in the medication  Plan at this time is to taper down Celexa to 20 mg 1 tablet daily for next 7 days and then to discontinue  The patient will start Cymbalta after 7 days from today starting with 20 mg 1 capsule daily for 7 days and then increase to 40 mg daily for next 3 weeks  The patient will at this time continue with BuSpar 20 mg 2 times a day with no changes for anxiety  I will also continue the Klonopin at the current dose which patient is taking off 0 5 mg in the morning and 1 mg at night  The patient was educated in detail about her current medication including benefit, risk, side effects, alternative, contraindication, dosage and frequency  She was specially educated about risk of serotonin syndrome and its symptoms and advised to call crisis or 911 in case she experience any of her symptoms  She also was advised about risk of increase in blood pressure and heart rate and if she noticed to call us  Patient will follow-up with me in 3-4 weeks or sooner if needed  She was advised to call us if there is any concern and call crisis or visit nearby ER in case of any emergency or SI or HI  The patient agrees  Diagnoses and all orders for this visit:    Generalized anxiety disorder with panic attacks  -     citalopram (CeleXA) 20 mg tablet; Take 1 tablet (20 mg total) by mouth in the morning for 7 days Then discontinue  -     clonazePAM (KlonoPIN) 0 5 mg tablet;  Take one tab (0 5 mg) by mouth in morning and 2 tabs (1 mg) in evening  - DULoxetine (CYMBALTA) 20 mg capsule; Take 1 capsule (20 mg total) by mouth daily for 7 days, THEN 2 capsules (40 mg total) daily for 23 days  -     busPIRone (BUSPAR) 10 mg tablet; Take 2 tablets (20 mg total) by mouth 2 (two) times a day    Major depressive disorder, recurrent, moderate (HCC)  -     citalopram (CeleXA) 20 mg tablet; Take 1 tablet (20 mg total) by mouth in the morning for 7 days Then discontinue  -     DULoxetine (CYMBALTA) 20 mg capsule; Take 1 capsule (20 mg total) by mouth daily for 7 days, THEN 2 capsules (40 mg total) daily for 23 days  Treatment Recommendations/Precautions:      Aware of 24 hour and weekend coverage for urgent situations accessed by calling [facility] main practice number    Risks/Benefits      Risks, Benefits And Possible Side Effects Of Medications:    Risks, benefits, and possible side effects of medications explained to [facility] and she verbalizes understanding and agreement for treatment  Controlled Medication Discussion:     [facility] has been filling controlled prescriptions on time as prescribed according to [facility]  Discussed with [facility] the risks of sedation, respiratory depression, impairment of ability to drive and potential for abuse and addiction related to treatment with benzodiazepine medications  She understands risk of treatment with benzodiazepine medications, agrees to not drive if feels impaired and agrees to take medications as prescribed  Discussed with Christopher Humphrey warning on concurrent use of benzodiazepines and opioid medications including sedation, respiratory depression, coma and death   She understands the risk of treatment with benzodiazepines in addition to opioids and wants to continue taking those medications  Discussed with [facility] increased risk of impairment related to concurrent use of benzodiazepines and hypnotic medications including excessive sedation, psychomotor impairment and respiratory depression  She understands the risk of treatment with benzodiazepines in addition to hypnotic medications and wants to continue taking those medications    Psychotherapy Provided:     Individual psychotherapy provided: Counseling was provided during the session today for 15 minutes  Medications, treatment progress and treatment plan reviewed with Roxy Castillo  Medication changes discussed with Roxy Castillo  Medication education provided to Roxy Castillo  Reassurance and supportive therapy provided  Crisis/safety plan discussed with Roxy Castillo       Treatment Plan;    Completed and signed during the session: Not applicable - Treatment Plan not due at this session the    Brynn Costa MD 09/26/22

## 2022-09-27 NOTE — TELEPHONE ENCOUNTER
Pt called reguarding a missed call from 09/07/2022 I did advise that it may have come through as Spam she is aware to answer if she sees it please reach back out

## 2022-10-26 ENCOUNTER — TELEMEDICINE (OUTPATIENT)
Dept: PSYCHIATRY | Facility: CLINIC | Age: 67
End: 2022-10-26
Payer: MEDICARE

## 2022-10-26 DIAGNOSIS — F33.1 MAJOR DEPRESSIVE DISORDER, RECURRENT, MODERATE (HCC): ICD-10-CM

## 2022-10-26 DIAGNOSIS — F41.0 GENERALIZED ANXIETY DISORDER WITH PANIC ATTACKS: ICD-10-CM

## 2022-10-26 DIAGNOSIS — F41.1 GENERALIZED ANXIETY DISORDER WITH PANIC ATTACKS: ICD-10-CM

## 2022-10-26 PROCEDURE — 99214 OFFICE O/P EST MOD 30 MIN: CPT | Performed by: PSYCHIATRY & NEUROLOGY

## 2022-10-26 RX ORDER — CLONAZEPAM 0.5 MG/1
0.25 TABLET ORAL EVERY OTHER DAY
Qty: 4 TABLET | Refills: 1
Start: 2022-10-26 | End: 2022-11-02

## 2022-10-26 RX ORDER — DULOXETIN HYDROCHLORIDE 20 MG/1
20 CAPSULE, DELAYED RELEASE ORAL DAILY
Qty: 90 CAPSULE | Refills: 0
Start: 2022-10-26 | End: 2023-01-24

## 2022-10-26 NOTE — PSYCH
Virtual Regular Visit    Verification of patient location:    Patient is located in the following state in which I hold an active license [place]      Assessment/Plan:    Problem List Items Addressed This Visit        Other    Major depressive disorder, recurrent, moderate (HCC)    Relevant Medications    DULoxetine (CYMBALTA) 20 mg capsule    Generalized anxiety disorder with panic attacks    Relevant Medications    clonazePAM (KlonoPIN) 0 5 mg tablet    DULoxetine (CYMBALTA) 20 mg capsule          Goals addressed in session: Goal 1 and Goal 2          Reason for visit is   Chief Complaint   Patient presents with   • Virtual Regular Visit        Encounter provider Milagros Randolph MD    Provider located at [facility] [id number]      Recent Visits  No visits were found meeting these conditions  Showing recent visits within past 7 days and meeting all other requirements  Today's Visits  Date Type Provider Dept   10/26/22 Telemedicine Milagros Randolph MD [facility] today's visits and meeting all other requirements  Future Appointments  No visits were found meeting these conditions  Showing future appointments within next 150 days and meeting all other requirements       The patient was identified by name and date of birth  Mini Jinny was informed that this is a telemedicine visit and [facility] the [facility]  She agrees to proceed     My office door was closed  No one else was in the room  She acknowledged consent and understanding of privacy and security of the video platform  The patient has agreed to participate and understands they can discontinue the visit at any time  Patient is aware this is a billable service       Subjective  See below      HPI     Past Medical History:   Diagnosis Date   • Hemochromatosis        No past surgical history on file  Current Outpatient Medications   Medication Sig Dispense Refill   • clonazePAM (KlonoPIN) 0 5 mg tablet Take 0 5 tablets (0 25 mg total) by mouth every other day for 7 days And then discontinue 4 tablet 1   • DULoxetine (CYMBALTA) 20 mg capsule Take 1 capsule (20 mg total) by mouth daily 90 capsule 0   • busPIRone (BUSPAR) 10 mg tablet TAKE 2 TABLETS BY MOUTH 2 TIMES A DAY  360 tablet 1   • Cholecalciferol 25 MCG (1000 UT) capsule Take 5,000 Units by mouth daily       No current facility-administered medications for this visit  Allergies   Allergen Reactions   • Sulfa Antibiotics Hives and Other (See Comments)     hives   • Ciprofloxacin Rash     Other reaction(s): rash  Other reaction(s): rash         Review of Systems    Video Exam    There were no vitals filed for this visit  Physical Exam     Visit Time    Visit Start Time:  9:35 a m  Visit Stop Time:  10:00 a m  Total Visit Duration: Twenty-five minutes     MEDICATION MANAGEMENT NOTE        [address] [place] [address]      Name and Date of Birth: Oly Villatoro MGXQ 89 y o  1955 MRN: [id number]    Date of Visit: October 26, 2022    Reason for Visit:  Follow-up for medication management  Subjective: The patient reports she has been doing very well nowadays  Reports her anxiety is very well controlled  The patient reports she stopped Celexa 20 mg after 7 days as recommended at last visit  She reports she started Cymbalta 20 mg daily and still is taking the same dose and did not went up to 40 mg daily  She reports she was struggling with nausea in the beginning due to which she avoided further tapering up of the dose as recommended  The patient reports she felt her anxiety resolved at the current dose and did not felt the need for increase in the dose  The patient also reports she stopped taking Klonopin abruptly 1 week back    She reports she missed bedtime dose of Klonopin 8 days back and did not felt anxiety in the morning due to which she stopped it abruptly  She though reports since then she has been having poor sleep along with headache  Reports able to fall sleep easily but wakes up few times at night  Has headache after she wakes up in the morning  Possible cause could be sudden discontinuation of Klonopin  The patient though reports she feels overall very well  Denies any depression  Reports appetite, energy level has been good  Reports her family and friends also has noticed significant difference  She denies any other concern  Reports has not checked her blood pressure recently and was advised to have the blood pressure checked today  Was advised in case her blood pressure is around 140/90 to repeat again after some time or if it is around or more than 160/90 to call her primary care physician or visit nearby urgent care  The patient verbalized understanding agrees again  She denies any other concern today  Reports still compliant with BuSpar  Review Of Systems:      Constitutional negative   ENT negative   Cardiovascular negative   Respiratory negative   Gastrointestinal negative   Genitourinary negative   Musculoskeletal negative   Integumentary negative   Neurological negative   Endocrine negative   Other Symptoms none       Alcohol/Substance Abuse:        Past Medical History:    Past Medical History:   Diagnosis Date   • Hemochromatosis         No past surgical history on file    Allergies   Allergen Reactions   • Sulfa Antibiotics Hives and Other (See Comments)     hives   • Ciprofloxacin Rash     Other reaction(s): rash  Other reaction(s): rash         Current Medications:       Current Outpatient Medications:   •  clonazePAM (KlonoPIN) 0 5 mg tablet, Take 0 5 tablets (0 25 mg total) by mouth every other day for 7 days And then discontinue, Disp: 4 tablet, Rfl: 1  •  DULoxetine (CYMBALTA) 20 mg capsule, Take 1 capsule (20 mg total) by mouth daily, Disp: 90 capsule, Rfl: 0  •  busPIRone (BUSPAR) 10 mg tablet, TAKE 2 TABLETS BY MOUTH 2 TIMES A DAY , Disp: 360 tablet, Rfl: 1  •  Cholecalciferol 25 MCG (1000 UT) capsule, Take 5,000 Units by mouth daily, Disp: , Rfl:        History Review: The following portions of the patient's history were reviewed and updated as appropriate: allergies, current medications, past family history, past medical history, past social history, past surgical history and problem list          OBJECTIVE:     Vital signs in last 24 hours: There were no vitals filed for this visit  Mental Status Evaluation:    Appearance age appropriate, casually dressed   Behavior cooperative, calm   Speech normal rate, normal volume, normal pitch   Mood normal   Affect normal range and intensity, appropriate   Thought Processes organized, goal directed   Associations intact associations   Thought Content no overt delusions   Perceptual Disturbances: no auditory hallucinations, no visual hallucinations   Abnormal Thoughts  Risk Potential Suicidal ideation - None  Homicidal ideation - None  Potential for aggression - No   Orientation oriented to person, place, time/date and situation   Memory recent and remote memory grossly intact   Consciousness alert and awake   Attention Span Concentration Span attention span and concentration are age appropriate   Intellect appears to be of average intelligence   Insight intact   Judgement intact   Muscle Strength and  Gait unable to assess today due to virtual visit   Motor activity unable to assess today due to virtual visit   Language no difficulty naming common objects, no difficulty repeating a phrase   Fund of Knowledge adequate knowledge of current events  adequate fund of knowledge regarding past history  adequate fund of knowledge regarding vocabulary    Pain none   Pain Scale 0       Laboratory Results: Most Recent Labs: I have personally reviewed all pertinent laboratory/tests results      Assessment/Plan:  Patient overall doing very well with significant improvement in anxiety  No depression reported  Reports headache and not very sound sleep over last 7 days but also it seems to be possibly due to suddenly stopping Klonopin  The patient will also get her blood pressure checked today to rule out if it is secondary to hypertension  The patient at this time will continue on Cymbalta 20 mg daily for depression and anxiety  She also will continue with BuSpar 20 mg twice a day at this time with no changes for anxiety  The patient was advised to take Klonopin 0 5 mg half a tablet every alternate day at bedtime for next 5-7 days and she can then discontinue  The patient educated about her medication in detail including benefit, risk, side effects, alternative, contraindication, dosage and frequency  She was advised to call me if there is any concern and to call crisis or visit nearby ER in case of any emergency or having ZOE Esquivel The patient verbalized understanding agrees with the plan  She also reports [email or web address] but has to pay out of her pocket  She also feels it has just been too appointment so far and still has not developed therapy declines  She is on waiting list for therapy at [facility] and possibly will like to transfer care here  Diagnoses and all orders for this visit:    Generalized anxiety disorder with panic attacks  -     clonazePAM (KlonoPIN) 0 5 mg tablet; Take 0 5 tablets (0 25 mg total) by mouth every other day for 7 days And then discontinue  -     DULoxetine (CYMBALTA) 20 mg capsule; Take 1 capsule (20 mg total) by mouth daily    Major depressive disorder, recurrent, moderate (HCC)  -     DULoxetine (CYMBALTA) 20 mg capsule;  Take 1 capsule (20 mg total) by mouth daily          Treatment Recommendations/Precautions:      Aware of 24 hour and weekend coverage for urgent situations accessed by calling [facility] main practice number    Risks/Benefits      Risks, Benefits And Possible Side Effects Of Medications:    Risks, benefits, and possible side effects of medications explained to Lo Beach and she verbalizes understanding and agreement for treatment  Controlled Medication Discussion:     Lo Beach has been filling controlled prescriptions on time as prescribed according to [facility] Monitoring Program    Psychotherapy Provided:     Individual psychotherapy provided: Counseling was provided during the session today for 10 minutes  Medications, treatment progress and treatment plan reviewed with Lo Beach  Medication changes discussed with Lo Beach  Medication education provided to Lo Beach  Reassurance and supportive therapy provided  Crisis/safety plan discussed with Lo Beach       Treatment Plan;    Completed and signed during the session: Not applicable - Treatment Plan not due at this session    Naveen Castaneda MD 10/26/22

## 2023-01-27 ENCOUNTER — TELEMEDICINE (OUTPATIENT)
Dept: PSYCHIATRY | Facility: CLINIC | Age: 68
End: 2023-01-27

## 2023-01-27 DIAGNOSIS — F41.1 GENERALIZED ANXIETY DISORDER WITH PANIC ATTACKS: ICD-10-CM

## 2023-01-27 DIAGNOSIS — F41.0 GENERALIZED ANXIETY DISORDER WITH PANIC ATTACKS: ICD-10-CM

## 2023-01-27 DIAGNOSIS — F33.41 MDD (MAJOR DEPRESSIVE DISORDER), RECURRENT, IN PARTIAL REMISSION (HCC): ICD-10-CM

## 2023-01-27 RX ORDER — DULOXETIN HYDROCHLORIDE 20 MG/1
20 CAPSULE, DELAYED RELEASE ORAL DAILY
Qty: 90 CAPSULE | Refills: 0 | Status: SHIPPED | OUTPATIENT
Start: 2023-01-27 | End: 2023-04-27

## 2023-01-27 NOTE — PSYCH
Virtual Regular Visit    Verification of patient location:    Patient is located in the following state in which I hold an active license [place]      Assessment/Plan:    Problem List Items Addressed This Visit        Other    Major depressive disorder, recurrent, moderate (HCC)    Relevant Medications    DULoxetine (CYMBALTA) 20 mg capsule    Generalized anxiety disorder with panic attacks    Relevant Medications    DULoxetine (CYMBALTA) 20 mg capsule       Goals addressed in session: Goal 1 and Goal 2          Reason for visit is   Chief Complaint   Patient presents with   • Virtual Regular Visit        Encounter provider Juvenal Segundo MD    Provider located at [facility] [id number]      Recent Visits  No visits were found meeting these conditions  Showing recent visits within past 7 days and meeting all other requirements  Today's Visits  Date Type Provider Dept   01/27/23 Telemedicine Juvenal Segundo MD [facility] 72 today's visits and meeting all other requirements  Future Appointments  No visits were found meeting these conditions  Showing future appointments within next 150 days and meeting all other requirements       The patient was identified by name and date of birth  Tia Hudson was informed that this is a telemedicine visit and [facility]  She agrees to proceed     My office door was closed  No one else was in the room  She acknowledged consent and understanding of privacy and security of the video platform  The patient has agreed to participate and understands they can discontinue the visit at any time  Patient is aware this is a billable service  Subjective  See below      HPI     Past Medical History:   Diagnosis Date   • Hemochromatosis        No past surgical history on file      Current Outpatient Medications   Medication Sig Dispense Refill   • DULoxetine (CYMBALTA) 20 mg capsule Take 1 capsule (20 mg total) by mouth daily 90 capsule 0   • busPIRone (BUSPAR) 10 mg tablet TAKE 2 TABLETS BY MOUTH 2 TIMES A DAY  360 tablet 1   • Cholecalciferol 25 MCG (1000 UT) capsule Take 5,000 Units by mouth daily       No current facility-administered medications for this visit  Allergies   Allergen Reactions   • Sulfa Antibiotics Hives and Other (See Comments)     hives   • Ciprofloxacin Rash     Other reaction(s): rash  Other reaction(s): rash         Review of Systems    Video Exam    There were no vitals filed for this visit  Physical Exam     Visit Time    Visit Start Time: 9:33 AM  Visit Stop Time: 9:53 AM  Total Visit Duration: 20 minutes    MEDICATION MANAGEMENT NOTE        [address] [place] [address]      Name and Date of Birth: Cale Mercedes WOBX 89 y o  1955 MRN: [id number]    Date of Visit: January 27, 2023    Reason for Visit: Follow-up for medication management    Subjective: The patient reports overall doing well  Reports anxiety has been well controlled  She reports she went to [place] to be with her daughter as her daughter's  has lymphoma  She reports her anxiety increased while she was there but it resolved after she came back to her home again  She initially was planning to have discuss with me about decrease in the BuSpar but after experiencing anxiety feels she wants to continue at this time  She reports taking Cymbalta as prescribed and denies any side effect  She also denies any side effect of BuSpar but felt that given she was doing well she could increase the dose  She started getting anxious again  Denies any depression nowadays  Reports overall feels much better now  Very hopeful  No SI or HI  Reports sleep and appetite has been mostly good  Denies any hallucinations  Reports compliant with her medication    Denies any medical issues lately  Reports her son-in-law is undergoing holistic therapy and will have a PET scan in a few weeks to know if it is working or not  In case it does not help then they are planning for chemotherapy  Review Of Systems:      Constitutional negative   ENT negative   Cardiovascular negative   Respiratory negative   Gastrointestinal negative   Genitourinary negative   Musculoskeletal negative   Integumentary negative   Neurological negative   Endocrine negative   Other Symptoms none       Alcohol/Substance Abuse: denies        Past Medical History:    Past Medical History:   Diagnosis Date   • Hemochromatosis         No past surgical history on file  Allergies   Allergen Reactions   • Sulfa Antibiotics Hives and Other (See Comments)     hives   • Ciprofloxacin Rash     Other reaction(s): rash  Other reaction(s): rash         Current Medications:       Current Outpatient Medications:   •  DULoxetine (CYMBALTA) 20 mg capsule, Take 1 capsule (20 mg total) by mouth daily, Disp: 90 capsule, Rfl: 0  •  busPIRone (BUSPAR) 10 mg tablet, TAKE 2 TABLETS BY MOUTH 2 TIMES A DAY , Disp: 360 tablet, Rfl: 1  •  Cholecalciferol 25 MCG (1000 UT) capsule, Take 5,000 Units by mouth daily, Disp: , Rfl:        History Review: The following portions of the patient's history were reviewed and updated as appropriate: allergies, current medications, past family history, past medical history, past social history, past surgical history and problem list          OBJECTIVE:     Vital signs in last 24 hours: There were no vitals filed for this visit      Mental Status Evaluation:    Appearance age appropriate, casually dressed   Behavior cooperative, calm   Speech normal rate, normal volume, normal pitch   Mood euthymic   Affect normal range and intensity, appropriate   Thought Processes organized, goal directed   Associations intact associations   Thought Content no overt delusions   Perceptual Disturbances: no auditory hallucinations, no visual hallucinations   Abnormal Thoughts  Risk Potential Suicidal ideation - None  Homicidal ideation - None  Potential for aggression - No   Orientation oriented to person, place, time/date and situation   Memory recent and remote memory grossly intact   Consciousness alert and awake   Attention Span Concentration Span attention span and concentration are age appropriate   Intellect appears to be of average intelligence   Insight intact   Judgement intact   Muscle Strength and  Gait unable to assess today due to virtual visit   Motor activity unable to assess today due to virtual visit   Language no difficulty naming common objects, no difficulty repeating a phrase   Fund of Knowledge adequate knowledge of current events  adequate fund of knowledge regarding past history  adequate fund of knowledge regarding vocabulary    Pain none   Pain Scale 0       Laboratory Results:     Assessment/Plan: The patient overall doing well and denies any concern  Plan is to continue the current medication with no changes  The patient is off the Klonopin and did well after  taper recommended last visit  Patient will follow up with me in 3 months or sooner if needed  She was advised to call me if there is any concern and to call crisis or visit nearby ER in case of any emergency  She also was  educated about her medication and advised to call me if there is any concern  Patient agrees  Diagnoses and all orders for this visit:    Generalized anxiety disorder with panic attacks  -     DULoxetine (CYMBALTA) 20 mg capsule; Take 1 capsule (20 mg total) by mouth daily    MDD (major depressive disorder), recurrent, in partial remission (HCC)  -     DULoxetine (CYMBALTA) 20 mg capsule;  Take 1 capsule (20 mg total) by mouth daily          Treatment Recommendations/Precautions:      Aware of 24 hour and weekend coverage for urgent situations accessed by calling [facility] main practice number    Risks/Benefits      Risks, Benefits And Possible Side Effects Of Medications:    Risks, benefits, and possible side effects of medications explained to Brad Mcgregor and she verbalizes understanding and agreement for treatment  Controlled Medication Discussion:     Not applicable    Psychotherapy Provided:     Individual psychotherapy provided: Medications, treatment progress and treatment plan reviewed with Brad Mcgregor  Medication education provided to Brad Mcgregor  Reassurance and supportive therapy provided  Crisis/safety plan discussed with Brad Mcgregor       Treatment Plan;    Completed and signed during the session: Not applicable - Treatment Plan not due at this session    Aston Leiva MD 01/27/23

## 2023-04-28 ENCOUNTER — TELEMEDICINE (OUTPATIENT)
Dept: PSYCHIATRY | Facility: CLINIC | Age: 68
End: 2023-04-28

## 2023-04-28 DIAGNOSIS — F41.0 GENERALIZED ANXIETY DISORDER WITH PANIC ATTACKS: ICD-10-CM

## 2023-04-28 DIAGNOSIS — F41.1 GENERALIZED ANXIETY DISORDER WITH PANIC ATTACKS: ICD-10-CM

## 2023-04-28 DIAGNOSIS — F33.41 MDD (MAJOR DEPRESSIVE DISORDER), RECURRENT, IN PARTIAL REMISSION (HCC): ICD-10-CM

## 2023-04-28 RX ORDER — DULOXETIN HYDROCHLORIDE 20 MG/1
20 CAPSULE, DELAYED RELEASE ORAL DAILY
Qty: 90 CAPSULE | Refills: 0 | Status: SHIPPED | OUTPATIENT
Start: 2023-04-28 | End: 2023-07-27

## 2023-04-28 NOTE — PSYCH
Virtual Regular Visit    Verification of patient location:    Patient is located at Home in the following state in which I hold an active license [place]      Assessment/Plan:    Problem List Items Addressed This Visit        Other    Generalized anxiety disorder with panic attacks    Relevant Medications    DULoxetine (CYMBALTA) 20 mg capsule   Other Visit Diagnoses     MDD (major depressive disorder), recurrent, in partial remission ([facility] [address] )        Relevant Medications    DULoxetine (CYMBALTA) 20 mg capsule          Goals addressed in session: Goal 1 and Goal 2          Reason for visit is   Chief Complaint   Patient presents with   • Virtual Regular Visit        Encounter provider Vianey Landry MD    Provider located at [facility] [id number]      Recent Visits  No visits were found meeting these conditions  Showing recent visits within past 7 days and meeting all other requirements  Today's Visits  Date Type Provider Dept   04/28/23 Telemedicine Vianey Landry MD [facility] today's visits and meeting all other requirements  Future Appointments  No visits were found meeting these conditions  Showing future appointments within next 150 days and meeting all other requirements       The patient was identified by name and date of birth  Lor Loaiza was informed that this is a telemedicine visit and that the [facility]  She agrees to proceed     My office door was closed  No one else was in the room  She acknowledged consent and understanding of privacy and security of the video platform  The patient has agreed to participate and understands they can discontinue the visit at any time  Patient is aware this is a billable service       Subjective  See below      HPI     Past Medical History:   Diagnosis Date   • Hemochromatosis        No past surgical history on file  Current Outpatient Medications   Medication Sig Dispense Refill   • DULoxetine (CYMBALTA) 20 mg capsule Take 1 capsule (20 mg total) by mouth daily 90 capsule 0   • busPIRone (BUSPAR) 10 mg tablet TAKE 2 TABLETS BY MOUTH TWICE A  tablet 1   • Cholecalciferol 25 MCG (1000 UT) capsule Take 5,000 Units by mouth daily       No current facility-administered medications for this visit  Allergies   Allergen Reactions   • Sulfa Antibiotics Hives and Other (See Comments)     hives   • Ciprofloxacin Rash     Other reaction(s): rash  Other reaction(s): rash         Review of Systems    Video Exam    There were no vitals filed for this visit  Physical Exam     Visit Time    Visit Start Time: 9:34 AM  Visit Stop Time: 9:50 AM  Total Visit Duration: 16 minutes     MEDICATION MANAGEMENT NOTE        [address] [place] [address]      Name and Date of Birth: Mana DEL CID 32 y o  1955 MRN: [id number]    Date of Visit: April 28, 2023    Reason for Visit: Follow-up for medication management  Subjective: The patient reports she has been feeling like being on edge nowadays  Reports has some anxiety lately  Reports she also has been having vivid dreams  Reports waking up very tired  The patient stated that she has been taking only 1 tablet of BuSpar in the evening rather than 2 tablets  The patient thought it might be causing her to feel exhausted in the morning and causing vivid dreams  Given though the patient has been feeling anxious she was advised to take BuSpar 10 mg 2 tablets as recommended but can take around 5 PM rather than later in the evening  She reports continuing with BuSpar 20 mg in the morning  She also is currently on low-dose of Cymbalta 20 mg daily but the patient reports he feels it has been helpful and does not want any change  She denies feeling depressed  Reports appetite has been fine    Denies any hopelessness or suicidal thoughts  Reports holistic therapy did not work for her son-in-law and he might be going for chemotherapy  Has been undergoing some initial lab work before considering chemotherapy  She denies any other concern  Denies any panic attacks  denies any other medical symptoms or concerns  Review Of Systems:      Constitutional negative   ENT negative   Cardiovascular negative   Respiratory negative   Gastrointestinal negative   Genitourinary negative   Musculoskeletal negative   Integumentary negative   Neurological negative   Endocrine negative   Other Symptoms none       Alcohol/Substance Abuse: Denies        Past Medical History:    Past Medical History:   Diagnosis Date   • Hemochromatosis         No past surgical history on file  Allergies   Allergen Reactions   • Sulfa Antibiotics Hives and Other (See Comments)     hives   • Ciprofloxacin Rash     Other reaction(s): rash  Other reaction(s): rash         Current Medications:       Current Outpatient Medications:   •  DULoxetine (CYMBALTA) 20 mg capsule, Take 1 capsule (20 mg total) by mouth daily, Disp: 90 capsule, Rfl: 0  •  busPIRone (BUSPAR) 10 mg tablet, TAKE 2 TABLETS BY MOUTH TWICE A DAY, Disp: 360 tablet, Rfl: 1  •  Cholecalciferol 25 MCG (1000 UT) capsule, Take 5,000 Units by mouth daily, Disp: , Rfl:        History Review: The following portions of the patient's history were reviewed and updated as appropriate: allergies, current medications, past family history, past medical history, past social history, past surgical history and problem list          OBJECTIVE:     Vital signs in last 24 hours: There were no vitals filed for this visit      Mental Status Evaluation:    Appearance age appropriate, casually dressed   Behavior cooperative, calm   Speech normal rate, normal volume, normal pitch   Mood mildly anxious   Affect normal range and intensity, appropriate   Thought Processes organized, goal directed   Associations intact associations   Thought Content no overt delusions   Perceptual Disturbances: no auditory hallucinations, no visual hallucinations   Abnormal Thoughts  Risk Potential Suicidal ideation - None  Homicidal ideation - None  Potential for aggression - No   Orientation oriented to person, place, time/date and situation   Memory recent and remote memory grossly intact   Consciousness alert and awake   Attention Span Concentration Span attention span and concentration are age appropriate   Intellect appears to be of average intelligence   Insight intact   Judgement intact   Muscle Strength and  Gait unable to assess today due to virtual visit   Motor activity unable to assess today due to virtual visit   Language no difficulty naming common objects, no difficulty repeating a phrase   Fund of Knowledge adequate knowledge of current events  adequate fund of knowledge regarding past history  adequate fund of knowledge regarding vocabulary    Pain none   Pain Scale 0       Laboratory Results: Most Recent Labs: I have personally reviewed all pertinent laboratory/tests results  Assessment/Plan: Patient reports mild anxiety nowadays  Occasional vivid dreams and feeling tired in the morning  No other concerns reported  The plan is to continue the current medication but has been advised to take BuSpar early in the evening rather than later in the day  Also has been advised to take BuSpar 20 mg twice a day rather than 20 mg in the morning and 10 mg in the evening  Continue with Cymbalta at this time with no change but the patient was advised that in case she feels anxiety has not been improving to give me a call and I can increase the dose of Cymbalta  Patient educated about her medication in detail and advised to call me if there is any concern and to call crisis or visit nearby ER in case of any emergency  The patient verbalized understanding and agrees with the plan    She will follow with me in 3 months or sooner if needed  Diagnoses and all orders for this visit:    Generalized anxiety disorder with panic attacks  -     DULoxetine (CYMBALTA) 20 mg capsule; Take 1 capsule (20 mg total) by mouth daily    MDD (major depressive disorder), recurrent, in partial remission (HCC)  -     DULoxetine (CYMBALTA) 20 mg capsule; Take 1 capsule (20 mg total) by mouth daily          Treatment Recommendations/Precautions:      Aware of 24 hour and weekend coverage for urgent situations accessed by calling [facility] main practice number    Risks/Benefits      Risks, Benefits And Possible Side Effects Of Medications:    Risks, benefits, and possible side effects of medications explained to Esvin Martinez and she verbalizes understanding and agreement for treatment  Controlled Medication Discussion:     Not applicable    Psychotherapy Provided:     Individual psychotherapy provided: Medications, treatment progress and treatment plan reviewed with Esvin Martinez  Medication education provided to Esvin Martinez  Reassurance and supportive therapy provided  Crisis/safety plan discussed with Esvin Martinez       Treatment Plan;    Completed and signed during the session: Not applicable - Treatment Plan not due at this session    Geovanna Girard MD 04/28/23

## 2023-07-17 ENCOUNTER — TELEPHONE (OUTPATIENT)
Dept: PSYCHIATRY | Facility: CLINIC | Age: 68
End: 2023-07-17

## 2023-07-17 NOTE — TELEPHONE ENCOUNTER
Patient called and left voice mail requesting to reschedule 7/20 appt.  Rescheduled for 8/26 830am virtual.

## 2023-07-28 DIAGNOSIS — F33.41 MDD (MAJOR DEPRESSIVE DISORDER), RECURRENT, IN PARTIAL REMISSION (HCC): ICD-10-CM

## 2023-07-28 DIAGNOSIS — F41.1 GENERALIZED ANXIETY DISORDER WITH PANIC ATTACKS: ICD-10-CM

## 2023-07-28 DIAGNOSIS — F41.0 GENERALIZED ANXIETY DISORDER WITH PANIC ATTACKS: ICD-10-CM

## 2023-07-28 RX ORDER — DULOXETIN HYDROCHLORIDE 20 MG/1
20 CAPSULE, DELAYED RELEASE ORAL DAILY
Qty: 90 CAPSULE | Refills: 0 | Status: SHIPPED | OUTPATIENT
Start: 2023-07-28

## 2023-08-28 ENCOUNTER — TELEPHONE (OUTPATIENT)
Dept: PSYCHIATRY | Facility: CLINIC | Age: 68
End: 2023-08-28

## 2023-08-29 ENCOUNTER — TELEMEDICINE (OUTPATIENT)
Dept: PSYCHIATRY | Facility: CLINIC | Age: 68
End: 2023-08-29
Payer: MEDICARE

## 2023-08-29 ENCOUNTER — TELEPHONE (OUTPATIENT)
Dept: PSYCHIATRY | Facility: CLINIC | Age: 68
End: 2023-08-29

## 2023-08-29 DIAGNOSIS — F41.1 GENERALIZED ANXIETY DISORDER WITH PANIC ATTACKS: ICD-10-CM

## 2023-08-29 DIAGNOSIS — F33.41 MDD (MAJOR DEPRESSIVE DISORDER), RECURRENT, IN PARTIAL REMISSION (HCC): ICD-10-CM

## 2023-08-29 DIAGNOSIS — F41.0 GENERALIZED ANXIETY DISORDER WITH PANIC ATTACKS: ICD-10-CM

## 2023-08-29 PROCEDURE — 90833 PSYTX W PT W E/M 30 MIN: CPT | Performed by: PSYCHIATRY & NEUROLOGY

## 2023-08-29 PROCEDURE — 99214 OFFICE O/P EST MOD 30 MIN: CPT | Performed by: PSYCHIATRY & NEUROLOGY

## 2023-08-29 RX ORDER — DULOXETINE 40 MG/1
40 CAPSULE, DELAYED RELEASE ORAL DAILY
Qty: 90 CAPSULE | Refills: 0 | Status: SHIPPED | OUTPATIENT
Start: 2023-08-29 | End: 2023-11-27

## 2023-08-29 RX ORDER — BUSPIRONE HYDROCHLORIDE 10 MG/1
20 TABLET ORAL 2 TIMES DAILY
Qty: 360 TABLET | Refills: 1 | Status: SHIPPED | OUTPATIENT
Start: 2023-08-29

## 2023-08-29 NOTE — PSYCH
Virtual Regular Visit    Verification of patient location:    Patient is located at Home in the following state in which I hold an active license [place]      Assessment/Plan:    Problem List Items Addressed This Visit        Other    Generalized anxiety disorder with panic attacks    Relevant Medications    DULoxetine 40 MG CPEP    busPIRone (BUSPAR) 10 mg tablet   Other Visit Diagnoses     MDD (major depressive disorder), recurrent, in partial remission (HCC)        Relevant Medications    DULoxetine 40 MG CPEP    busPIRone (BUSPAR) 10 mg tablet          Goals addressed in session: Goal 1 and Goal 2          Reason for visit is   Chief Complaint   Patient presents with   • Virtual Regular Visit        Encounter provider Rae Bain MD    Provider located at [address] [place] [id number]      Recent Visits  Date Type Provider Dept   08/28/23 Telephone Rae Bain MD [address] recent visits within past 7 days and meeting all other requirements  Today's Visits  Date Type Provider Dept   08/29/23 Telephone Rae Bain  [address]   08/29/23 Telemedicine Rae Bain MD [address] [facility] [employer] today's visits and meeting all other requirements  Future Appointments  No visits were found meeting these conditions. Showing future appointments within next 150 days and meeting all other requirements       The patient was identified by name and date of birth. Jeannine Lombardi was informed that this is a telemedicine visit and [facility] She agrees to proceed. .  My office door was closed. No one else was in the room. She acknowledged consent and understanding of privacy and security of the video platform.  The patient has agreed to participate and understands they can discontinue the visit at any time. Patient is aware this is a billable service. Subjective  See below      HPI     Past Medical History:   Diagnosis Date   • Hemochromatosis        Past Surgical History:   Procedure Laterality Date   • [facility]/ [facility]  [facility]One 6mm polyp in ascending colon, ileum normal. Bx: hyperplastic polyp       Current Outpatient Medications   Medication Sig Dispense Refill   • busPIRone (BUSPAR) 10 mg tablet Take 2 tablets (20 mg total) by mouth 2 (two) times a day 360 tablet 1   • DULoxetine 40 MG CPEP Take 1 capsule (40 mg total) by mouth daily 90 capsule 0   • ASHWAGANDHA PO Take by mouth     • Cholecalciferol 25 MCG (1000 UT) capsule Take 5,000 Units by mouth daily     • MAGNESIUM PO Take by mouth     • NON FORMULARY Whole Food Supplements     • Omega-3 Fatty Acids (FISH OIL PO) Take by mouth     • Probiotic Product (FORTIFY PROBIOTIC WOMENS PO) Take by mouth     • TURMERIC PO Take by mouth       No current facility-administered medications for this visit. Allergies   Allergen Reactions   • Sulfa Antibiotics Hives and Other (See Comments)     hives   • Ciprofloxacin Rash     Other reaction(s): rash  Other reaction(s): rash         Review of Systems    Video Exam    There were no vitals filed for this visit. Physical Exam     Visit Time    Visit Start Time: 8:36 AM  Visit Stop Time: 8:55 AM  Total Visit Duration: 19 minutes     MEDICATION MANAGEMENT NOTE        [address] [place] [address]      Name and Date of Birth: Christine Beltran ZFPR 83 y.o. 1955 MRN: [id number]    Date of Visit: August 29, 2023    Reason for Visit: Follow-up for medication management    Subjective: The patient reports she was doing very well until early August.  Reports she had a significant verbal altercation with her 1 of the siblings over the issue of care of her mother. She reported it really affected her and she started getting very anxious.   The patient reports she started taking Klonopin 0.5 mg once a day. Her last prescription of Klonopin was felt late last year but the patient reported she was not taking till again in August.  She reports her anxiety is slightly better over the last few days and did not require Klonopin. She denies feeling depressed or hopeless. Though still feels very hurt about her altercation with her sister as she thought she would was the one very close to her. The patient also worried about her sister's mental health and wanted her to seek help but she declined. She reports her son-in-law is doing better and undergoing chemotherapy. She denies any other concern. Patient feels she is ready to increase the dose of Cymbalta. She denies any SI or HI. Reports sleep overall has been okay but lately due to the issues with her sibling she felt it was effective but again getting better. Appetite has been fine. Energy level and concentration has been okay. Denies any other concerns today. Denies any side effects on the medication. Denies any symptoms of serotonin syndrome. Review Of Systems:      Constitutional negative   ENT negative   Cardiovascular negative   Respiratory negative   Gastrointestinal negative   Genitourinary negative   Musculoskeletal negative   Integumentary negative   Neurological negative   Endocrine negative   Other Symptoms none       Alcohol/Substance Abuse: denies        Past Medical History:    Past Medical History:   Diagnosis Date   • Hemochromatosis         Past Surgical History:   Procedure Laterality Date   • COLONOSCOPY W/ BIOPSIES  09/26/2017    [facility] MD.-One 6mm polyp in ascending colon, ileum normal. Bx: hyperplastic polyp     Allergies   Allergen Reactions   • Sulfa Antibiotics Hives and Other (See Comments)     hives   • Ciprofloxacin Rash     Other reaction(s): rash  Other reaction(s): rash         Current Medications:       Current Outpatient Medications:   •  busPIRone (BUSPAR) 10 mg tablet, Take 2 tablets (20 mg total) by mouth 2 (two) times a day, Disp: 360 tablet, Rfl: 1  •  DULoxetine 40 MG CPEP, Take 1 capsule (40 mg total) by mouth daily, Disp: 90 capsule, Rfl: 0  •  ASHWAGANDHA PO, Take by mouth, Disp: , Rfl:   •  Cholecalciferol 25 MCG (1000 UT) capsule, Take 5,000 Units by mouth daily, Disp: , Rfl:   •  MAGNESIUM PO, Take by mouth, Disp: , Rfl:   •  NON FORMULARY, Whole Food Supplements, Disp: , Rfl:   •  Omega-3 Fatty Acids (FISH OIL PO), Take by mouth, Disp: , Rfl:   •  Probiotic Product (FORTIFY PROBIOTIC WOMENS PO), Take by mouth, Disp: , Rfl:   •  TURMERIC PO, Take by mouth, Disp: , Rfl:        History Review: The following portions of the patient's history were reviewed and updated as appropriate: allergies, current medications, past family history, past medical history, past social history, past surgical history and problem list.         OBJECTIVE:     Vital signs in last 24 hours: There were no vitals filed for this visit.     Mental Status Evaluation:    Appearance age appropriate, casually dressed   Behavior cooperative, calm   Speech normal rate, normal volume, normal pitch   Mood anxious   Affect normal range and intensity, appropriate   Thought Processes organized, goal directed   Associations intact associations   Thought Content no overt delusions   Perceptual Disturbances: no auditory hallucinations, no visual hallucinations   Abnormal Thoughts  Risk Potential Suicidal ideation - None  Homicidal ideation - None  Potential for aggression - No   Orientation oriented to person, place, time/date and situation   Memory recent and remote memory grossly intact   Consciousness alert and awake   Attention Span Concentration Span attention span and concentration are age appropriate   Intellect appears to be of average intelligence   Insight intact   Judgement intact   Muscle Strength and  Gait unable to assess today due to virtual visit   Motor activity unable to assess today due to virtual visit   Language no difficulty naming common objects, no difficulty repeating a phrase   Fund of Knowledge adequate knowledge of current events  adequate fund of knowledge regarding past history  adequate fund of knowledge regarding vocabulary    Pain none   Pain Scale 0       Laboratory Results: Most Recent Labs: No results found for: "WBC", "RBC", "HGB", "HCT", "PLT", "RDW", "NEUTROABS", "NA", "SODIUM", "K", "CL", "CO2", "BUN", "CREATININE", "GLUCOSE", "CALCIUM", "AST", "ALT", "ALKPHOS", "PROT", "TP", "BILITOT", "TBILI", "CHOL", "CHOLESTEROL", "TRIG", "HDL", "LDLCALC", "[address]", "VALPROICTOT", "CARBAMAZEPIN", "LITHIUM", "AMMONIA", "[id number]", "Tianna Parent", "T3FREE", "PREGTESTUR", "PREGSERUM", "HCG", "HCGQUANT", "RPR"  I have personally reviewed all pertinent laboratory/tests results. Assessment/Plan: Anxiety lately due to the situational stressors mentioned above. Denies any significant depression. Plan is to increase the dose of Cymbalta to 40 mg daily for management of depression and anxiety. Will continue with BuSpar 20 mg twice a day with no change for anxiety. The patient still has enough pills for Klonopin 0.5 mg and was advised to take 0.5 mg 1 tablet daily as needed for severe anxiety or panic attack. The patient educated about her meds in detail and advised to call me if there is any concern and to call crisis or visit nearby ER in case of any emergency/si/hi. The patient verbalized understanding and agrees with the plan. Diagnoses and all orders for this visit:    Generalized anxiety disorder with panic attacks  -     DULoxetine 40 MG CPEP; Take 1 capsule (40 mg total) by mouth daily  -     busPIRone (BUSPAR) 10 mg tablet; Take 2 tablets (20 mg total) by mouth 2 (two) times a day    MDD (major depressive disorder), recurrent, in partial remission (HCC)  -     DULoxetine 40 MG CPEP;  Take 1 capsule (40 mg total) by mouth daily          Treatment Recommendations/Precautions:      Aware of 24 hour and weekend coverage for urgent situations accessed by calling [facility] main practice number    Risks/Benefits      Risks, Benefits And Possible Side Effects Of Medications:    Risks, benefits, and possible side effects of medications explained to [facility] and she verbalizes understanding and agreement for treatment. Controlled Medication Discussion:     [facility] has been filling controlled prescriptions on time as prescribed according to [address] Program  Discussed with [facility] the risks of sedation, respiratory depression, impairment of ability to drive and potential for abuse and addiction related to treatment with benzodiazepine medications. She understands risk of treatment with benzodiazepine medications, agrees to not drive if feels impaired and agrees to take medications as prescribed  Discussed with [facility] increased risk of impairment related to concurrent use of benzodiazepines and hypnotic medications including excessive sedation, psychomotor impairment and respiratory depression. She understands the risk of treatment with benzodiazepines in addition to hypnotic medications and wants to continue taking those medications    Psychotherapy Provided:     Individual psychotherapy provided: Counseling was provided during the session today for 16 minutes. Supportive psychotherapy  Medications, treatment progress and treatment plan reviewed with [facility]. Medication changes discussed with [facility]. Medication education provided to [facility]. Reassurance and supportive therapy provided. Crisis/safety plan discussed with [facility].      Treatment Plan;    Completed and signed during the session: Not applicable - Treatment Plan not due at this session    Onelia Funes MD 08/29/23

## 2023-08-29 NOTE — TELEPHONE ENCOUNTER
Patient contacted the office in regards to her virtual visit on 8/29/23 at 4:00p. Patient stated she has not received a link to the virtual session. Writer informed the patient he will notify the provider of the call. The provider will be sending link at his earliest convenience.

## 2023-09-10 DIAGNOSIS — F33.41 MDD (MAJOR DEPRESSIVE DISORDER), RECURRENT, IN PARTIAL REMISSION (HCC): ICD-10-CM

## 2023-09-10 DIAGNOSIS — F41.0 GENERALIZED ANXIETY DISORDER WITH PANIC ATTACKS: ICD-10-CM

## 2023-09-10 DIAGNOSIS — F41.1 GENERALIZED ANXIETY DISORDER WITH PANIC ATTACKS: ICD-10-CM

## 2023-09-11 RX ORDER — DULOXETIN HYDROCHLORIDE 20 MG/1
40 CAPSULE, DELAYED RELEASE ORAL DAILY
Qty: 180 CAPSULE | Refills: 0 | Status: SHIPPED | OUTPATIENT
Start: 2023-09-11

## 2023-11-15 ENCOUNTER — TELEMEDICINE (OUTPATIENT)
Dept: PSYCHIATRY | Facility: CLINIC | Age: 68
End: 2023-11-15
Payer: MEDICARE

## 2023-11-15 DIAGNOSIS — F41.1 GENERALIZED ANXIETY DISORDER WITH PANIC ATTACKS: ICD-10-CM

## 2023-11-15 DIAGNOSIS — F33.41 MDD (MAJOR DEPRESSIVE DISORDER), RECURRENT, IN PARTIAL REMISSION (HCC): ICD-10-CM

## 2023-11-15 DIAGNOSIS — F41.0 GENERALIZED ANXIETY DISORDER WITH PANIC ATTACKS: ICD-10-CM

## 2023-11-15 PROCEDURE — 99214 OFFICE O/P EST MOD 30 MIN: CPT | Performed by: PSYCHIATRY & NEUROLOGY

## 2023-11-15 RX ORDER — DULOXETIN HYDROCHLORIDE 20 MG/1
40 CAPSULE, DELAYED RELEASE ORAL DAILY
Qty: 180 CAPSULE | Refills: 1 | Status: SHIPPED | OUTPATIENT
Start: 2023-11-15

## 2023-11-15 NOTE — PSYCH
Virtual Regular Visit    Verification of patient location:    Patient is located at Home in the following state in which I hold an active license [place]      Assessment/Plan:    Problem List Items Addressed This Visit          Other    Generalized anxiety disorder with panic attacks    Relevant Medications    DULoxetine (CYMBALTA) 20 mg capsule     Other Visit Diagnoses       MDD (major depressive disorder), recurrent, in partial remission ([address])        Relevant Medications    DULoxetine (CYMBALTA) 20 mg capsule            Goals addressed in session: Goal 1          Reason for visit is   Chief Complaint   Patient presents with    Virtual Regular Visit          Encounter provider Ender Escalante MD    Provider located at [address] [address]      Recent Visits  No visits were found meeting these conditions. Showing recent visits within past 7 days and meeting all other requirements  Today's Visits  Date Type Provider Dept   11/15/23 Telemedicine Ender Escalante MD [facility] today's visits and meeting all other requirements  Future Appointments  No visits were found meeting these conditions. Showing future appointments within next 150 days and meeting all other requirements       The patient was identified by name and date of birth. Lenny Mtz was informed that this is a telemedicine visit and [facility] She agrees to proceed. .  My office door was closed. No one else was in the room. She acknowledged consent and understanding of privacy and security of the video platform. The patient has agreed to participate and understands they can discontinue the visit at any time. Patient is aware this is a billable service.      Subjective  See below      HPI     Past Medical History:   Diagnosis Date    Hemochromatosis        Past Surgical History:   Procedure Laterality Date    [facility]/ [facility]One 6mm polyp in ascending colon, ileum normal. Bx: hyperplastic polyp       Current Outpatient Medications   Medication Sig Dispense Refill    DULoxetine (CYMBALTA) 20 mg capsule Take 2 capsules (40 mg total) by mouth daily 180 capsule 1    ASHWAGANDHA PO Take by mouth      busPIRone (BUSPAR) 10 mg tablet Take 2 tablets (20 mg total) by mouth 2 (two) times a day 360 tablet 1    Cholecalciferol 25 MCG (1000 UT) capsule Take 5,000 Units by mouth daily      MAGNESIUM PO Take by mouth      NON FORMULARY Whole Food Supplements      Omega-3 Fatty Acids (FISH OIL PO) Take by mouth      Probiotic Product (FORTIFY PROBIOTIC WOMENS PO) Take by mouth      TURMERIC PO Take by mouth       No current facility-administered medications for this visit. Allergies   Allergen Reactions    Sulfa Antibiotics Hives and Other (See Comments)     hives    Ciprofloxacin Rash     Other reaction(s): rash  Other reaction(s): rash         Review of Systems    Video Exam    There were no vitals filed for this visit. Physical Exam     Visit Time    Visit Start Time: 8:40 AM   Visit Stop Time: 8:52 AM  Total Visit Duration:  12 minutes    MEDICATION MANAGEMENT NOTE        [facility]      Name and Date of Birth: Christine Beltran YPLP 89 y.o. 1955 MRN: [id number]    Date of Visit: November 15, 2023    Reason for Visit: Follow-up for medication management. Subjective: Patient reports she has been overall doing very well. Reports came back from vacation from [facility]. Went well. Though tested positive for COVID after her return but did not have any clinical symptoms. She reports she still struggles with stressor in regard to her family especially her mother is still not well and stressful relations with her one of the sisters. She though reports she is coping with it very well. Reports increase in the Cymbalta at last visit was very helpful. Denies feeling depressed or hopeless. Denies any suicidal thoughts. Reports sleep and appetite has been good. Denies any significant anxiety. Denies any panic attacks since she last saw me. Reports only needed to take Klonopin 1 or 2 times since last visit in August.  Reports compliant with BuSpar and Cymbalta and denies any side effects. Though the patient stated that given she is doing well she is thinking about decreasing her dose. At this time given the stressors are still present along with the weather changing the patient was advised to decrease continue for next 6 months and we can reassess again at next visit to see if we can decrease the dose. The patient agrees. In case the patient wants then we will consider decreasing the BuSpar dose. The patient denies any other concern. Denies any other medical issues. Review Of Systems:      Constitutional negative   ENT negative   Cardiovascular negative   Respiratory negative   Gastrointestinal negative   Genitourinary negative   Musculoskeletal negative   Integumentary negative   Neurological negative   Endocrine negative   Other Symptoms none       Alcohol/Substance Abuse: denies        Past Medical History:    Past Medical History:   Diagnosis Date    Hemochromatosis         Past Surgical History:   Procedure Laterality Date    COLONOSCOPY W/ BIOPSIES  09/26/2017    [facility].-One 6mm polyp in ascending colon, ileum normal. Bx: hyperplastic polyp     Allergies   Allergen Reactions    Sulfa Antibiotics Hives and Other (See Comments)     hives    Ciprofloxacin Rash     Other reaction(s): rash  Other reaction(s): rash         Current Medications:       Current Outpatient Medications:     DULoxetine (CYMBALTA) 20 mg capsule, Take 2 capsules (40 mg total) by mouth daily, Disp: 180 capsule, Rfl: 1    ASHWAGANDHA PO, Take by mouth, Disp: , Rfl:     busPIRone (BUSPAR) 10 mg tablet, Take 2 tablets (20 mg total) by mouth 2 (two) times a day, Disp: 360 tablet, Rfl: 1    Cholecalciferol 25 MCG (1000 UT) capsule, Take 5,000 Units by mouth daily, Disp: , Rfl:     MAGNESIUM PO, Take by mouth, Disp: , Rfl:     NON FORMULARY, Whole Food Supplements, Disp: , Rfl:     Omega-3 Fatty Acids (FISH OIL PO), Take by mouth, Disp: , Rfl:     Probiotic Product (FORTIFY PROBIOTIC WOMENS PO), Take by mouth, Disp: , Rfl:     TURMERIC PO, Take by mouth, Disp: , Rfl:        History Review: The following portions of the patient's history were reviewed and updated as appropriate: allergies, current medications, past family history, past medical history, past social history, past surgical history, and problem list.         OBJECTIVE:     Vital signs in last 24 hours: There were no vitals filed for this visit.     Mental Status Evaluation:    Appearance age appropriate, casually dressed   Behavior cooperative, calm   Speech normal rate, normal volume, normal pitch   Mood normal   Affect normal range and intensity, appropriate   Thought Processes organized, goal directed   Associations intact associations   Thought Content no overt delusions   Perceptual Disturbances: no auditory hallucinations, no visual hallucinations   Abnormal Thoughts  Risk Potential Suicidal ideation - None  Homicidal ideation - None  Potential for aggression - No   Orientation oriented to person, place, time/date, and situation   Memory recent and remote memory grossly intact   Consciousness alert and awake   Attention Span Concentration Span attention span and concentration are age appropriate   Intellect appears to be of average intelligence   Insight intact   Judgement intact   Muscle Strength and  Gait unable to assess today due to virtual visit   Motor activity unable to assess today due to virtual visit   Language no difficulty naming common objects, no difficulty repeating a phrase   Fund of Knowledge adequate knowledge of current events  adequate fund of knowledge regarding past history  adequate fund of knowledge regarding vocabulary    Pain none   Pain Scale 0       Laboratory Results: Most Recent Labs: No results found for: "WBC", "RBC", "HGB", "HCT", "PLT", "RDW", "NEUTROABS", "NA", "SODIUM", "K", "CL", "CO2", "BUN", "CREATININE", "GLUCOSE", "CALCIUM", "AST", "ALT", "ALKPHOS", "PROT", "TP", "BILITOT", "TBILI", "CHOL", "CHOLESTEROL", "TRIG", "HDL", "LDLCALC", "[address]", "VALPROICTOT", "CARBAMAZEPIN", "LITHIUM", "AMMONIA", "[id number]", "Daved Leona", "T3FREE", "PREGTESTUR", "PREGSERUM", "HCG", "HCGQUANT", "RPR"  I have personally reviewed all pertinent laboratory/tests results. Assessment/Plan: The patient overall doing well and denies any concern. Plan is to continue with current psychiatric medication with no changes. Patient wanted to know if her medication doses can be decreased in the future and the patient was advised that I can be reviewed with her again in 6 months and if she continues doing well. Consider decreasing the dose of the BuSpar. Patient verbalized understanding agrees with the plan. She was again educated about her meds in detail and advised to call us if there is any concern and to call [facility], 911 or visit nearby ER in case of any emergency or having any SI or HI. The patient agrees with the plan. Diagnoses and all orders for this visit:    MDD (major depressive disorder), recurrent, in partial remission (HCC)  -     DULoxetine (CYMBALTA) 20 mg capsule; Take 2 capsules (40 mg total) by mouth daily    Generalized anxiety disorder with panic attacks  -     DULoxetine (CYMBALTA) 20 mg capsule;  Take 2 capsules (40 mg total) by mouth daily          Treatment Recommendations/Precautions:      Aware of 24 hour and weekend coverage for urgent situations accessed by calling [facility] main practice number    Risks/Benefits      Risks, Benefits And Possible Side Effects Of Medications:    Risks, benefits, and possible side effects of medications explained to [facility] and she verbalizes understanding and agreement for treatment. Controlled Medication Discussion:     [facility] has been filling controlled prescriptions on time as prescribed according to [address] [facility]  Discussed with [facility] the risks of sedation, respiratory depression, impairment of ability to drive and potential for abuse and addiction related to treatment with benzodiazepine medications. She understands risk of treatment with benzodiazepine medications, agrees to not drive if feels impaired and agrees to take medications as prescribed  Discussed with [facility] increased risk of impairment related to concurrent use of benzodiazepines and hypnotic medications including excessive sedation, psychomotor impairment and respiratory depression. She understands the risk of treatment with benzodiazepines in addition to hypnotic medications and wants to continue taking those medications    Psychotherapy Provided:     Individual psychotherapy provided: Medications, treatment progress and treatment plan reviewed with [facility]. Medication education provided to [facility]. Reassurance and supportive therapy provided. Crisis/safety plan discussed with [facility]. Treatment Plan;    Completed and signed during the session: Yes - Treatment Plan done but not signed at time of office visit due to:  Plan reviewed by video and verbal consent given due to virtual visit.     Eva Canseco MD 11/15/23

## 2023-11-15 NOTE — BH TREATMENT PLAN
TREATMENT PLAN (Medication Management Only)        [facility]    Name and Date of Birth: Christine SANDOVAL 9 y.o. 1955  Date of Treatment Plan: November 15, 2023  Diagnosis/Diagnoses:    1. MDD (major depressive disorder), recurrent, in partial remission ([address])    2. Generalized anxiety disorder with panic attacks      Strengths/Personal Resources for Self-Care: supportive family, taking medications as prescribed, ability to adapt to life changes, ability to communicate needs, ability to communicate well, ability to listen, ability to reason, ability to understand psychiatric illness. Area/Areas of need (in own words): anxiety, depression  1. Long Term Goal: Feel better about self. Target Date:6 months - 5/15/2024  Person/Persons responsible for completion of goal: Adilia  2. Short Term Objective (s) - How will we reach this goal?:   A. Provider new recommended medication/dosage changes and/or continue medication(s): continue current medications as prescribed. B. N/A.  C. N/A. Target Date:6 months - 5/15/2024  Person/Persons Responsible for Completion of Goal: Adilia  Progress Towards Goals: continuing treatment  Treatment Modality: medication management every 6 months  Review due 180 days from date of this plan: 6 months - 5/15/2024  Expected length of service: ongoing treatment  My Physician/PA/NP and I have developed this plan together and I agree to work on the goals and objectives. I understand the treatment goals that were developed for my treatment.

## 2024-05-08 DIAGNOSIS — F41.1 GENERALIZED ANXIETY DISORDER WITH PANIC ATTACKS: ICD-10-CM

## 2024-05-08 DIAGNOSIS — F41.0 GENERALIZED ANXIETY DISORDER WITH PANIC ATTACKS: ICD-10-CM

## 2024-05-08 RX ORDER — BUSPIRONE HYDROCHLORIDE 10 MG/1
20 TABLET ORAL 2 TIMES DAILY
Qty: 360 TABLET | Refills: 1 | Status: SHIPPED | OUTPATIENT
Start: 2024-05-08

## 2024-05-15 ENCOUNTER — TELEMEDICINE (OUTPATIENT)
Dept: PSYCHIATRY | Facility: CLINIC | Age: 69
End: 2024-05-15

## 2024-05-15 DIAGNOSIS — F41.0 GENERALIZED ANXIETY DISORDER WITH PANIC ATTACKS: ICD-10-CM

## 2024-05-15 DIAGNOSIS — F33.41 MDD (MAJOR DEPRESSIVE DISORDER), RECURRENT, IN PARTIAL REMISSION (HCC): ICD-10-CM

## 2024-05-15 DIAGNOSIS — F41.1 GENERALIZED ANXIETY DISORDER WITH PANIC ATTACKS: ICD-10-CM

## 2024-05-15 NOTE — BH TREATMENT PLAN
TREATMENT PLAN (Medication Management Only)        [facility]    Name and Date of Birth:  Adilia Geiger 68 y.o. 1955  Date of Treatment Plan: May 15, 2024  Diagnosis/Diagnoses:    1. Generalized anxiety disorder with panic attacks    2. MDD (major depressive disorder), recurrent, in partial remission (HCC)      Strengths/Personal Resources for Self-Care: supportive family, supportive friends, taking medications as prescribed, ability to adapt to life changes, ability to communicate needs, ability to communicate well, ability to listen, ability to reason, ability to understand psychiatric illness, average or above intelligence.  Area/Areas of need (in own words): anxiety, depression  1. Long Term Goal: Feel happier.  Target Date:6 months - 11/15/2024  Person/Persons responsible for completion of goal: Adilia  2.  Short Term Objective (s) - How will we reach this goal?:   A. Provider new recommended medication/dosage changes and/or continue medication(s): continue current medications as prescribed.  B. N/A.  C. N/A.  Target Date:6 months - 11/15/2024  Person/Persons Responsible for Completion of Goal: Adilia  Progress Towards Goals: continuing treatment  Treatment Modality: medication management every 6 months  Review due 180 days from date of this plan: 6 months - 11/15/2024  Expected length of service: ongoing treatment  My Physician/PA/NP and I have developed this plan together and I agree to work on the goals and objectives. I understand the treatment goals that were developed for my treatment.

## 2024-05-15 NOTE — PSYCH
Virtual Regular Visit    Verification of patient location:    Patient is located at Home in the following state in which I hold an active license [place]      Assessment/Plan:    Problem List Items Addressed This Visit          Behavioral Health    Generalized anxiety disorder with panic attacks     Other Visit Diagnoses       MDD (major depressive disorder), recurrent, in partial remission (HCC)                Goals addressed in session: Goal 1 and Goal 2          Reason for visit is   Chief Complaint   Patient presents with    Virtual Regular Visit          Encounter provider Gabo Gorman MD      Recent Visits  No visits were found meeting these conditions.  Showing recent visits within past 7 days and meeting all other requirements  Today's Visits  Date Type Provider Dept   05/15/24 Telemedicine Gabo Gorman MD [facility]   Showing today's visits and meeting all other requirements  Future Appointments  No visits were found meeting these conditions.  Showing future appointments within next 150 days and meeting all other requirements       The patient was identified by name and date of birth. Adilia Geiger was informed that this is a telemedicine visit and that the visit is being conducted throughthe Epic Embedded platform. She agrees to proceed..  My office door was closed. No one else was in the room.  She acknowledged consent and understanding of privacy and security of the video platform. The patient has agreed to participate and understands they can discontinue the visit at any time.    Patient is aware this is a billable service.     Subjective  See below      HPI     Past Medical History:   Diagnosis Date    Hemochromatosis        Past Surgical History:   Procedure Laterality Date    COLONOSCOPY W/ BIOPSIES  09/26/2017    TYLER Lan MD.-One 6mm polyp in ascending colon, ileum normal. Bx: hyperplastic polyp       Current Outpatient Medications   Medication Sig Dispense Refill     ASHWAGANDHA PO Take by mouth      busPIRone (BUSPAR) 10 mg tablet TAKE 2 TABLETS BY MOUTH 2 TIMES A DAY. 360 tablet 1    Cholecalciferol 25 MCG (1000 UT) capsule Take 5,000 Units by mouth daily      DULoxetine (CYMBALTA) 20 mg capsule Take 2 capsules (40 mg total) by mouth daily 180 capsule 1    MAGNESIUM PO Take by mouth      NON FORMULARY Whole Food Supplements      Omega-3 Fatty Acids (FISH OIL PO) Take by mouth      Probiotic Product (FORTIFY PROBIOTIC WOMENS PO) Take by mouth      TURMERIC PO Take by mouth       No current facility-administered medications for this visit.        Allergies   Allergen Reactions    Sulfa Antibiotics Hives and Other (See Comments)     hives    Ciprofloxacin Rash     Other reaction(s): rash  Other reaction(s): rash         Review of Systems    Video Exam    There were no vitals filed for this visit.    Physical Exam     Visit Time    Visit Start Time: 9:06 AM  Visit Stop Time: 9:23 AM  Total Visit Duration:  17 minutes      MEDICATION MANAGEMENT NOTE        [facility]      Name and Date of Birth:  Adilia Geiger 68 y.o. 1955 MRN: [id number]    Date of Visit: May 15, 2024    Reason for Visit: Follow-up for medication management    Subjective: The patient reports she has been doing well since she last saw me 6 months back.  Reports mood has been stable and denies any depression or hopelessness.  Reports her anxiety also has been well controlled.  Denies any SI or HI.  Reports sleep, appetite, energy level has been fine.  She reports her major concern has been her hip pain.  Reports it can range from 6-8 in severity on a scale of 0-10 with 10 being the worst.  Reports has been following with orthopedic doctor for it.  The patient denies any other medical issues.  Reports compliant with Cymbalta and BuSpar and wants to have decrease in the psychiatric medication dose.  I reviewed with the patient all her medication options including  the benefit versus risk.  Patient was recommended to consider decreasing the dose of BuSpar given the Cymbalta in addition to treatment for anxiety depression helps with the pain.  The patient agreed.  She denies any other concerns.       Review Of Systems:      Constitutional negative   ENT negative   Cardiovascular negative   Respiratory negative   Gastrointestinal negative   Genitourinary negative   Musculoskeletal hip pain   Integumentary negative   Neurological negative   Endocrine negative   Other Symptoms none       Current Rating Scores:     Current PHQ-9   PHQ-2/9 Depression Screening    Little interest or pleasure in doing things: 0 - not at all  Feeling down, depressed, or hopeless: 0 - not at all  Trouble falling or staying asleep, or sleeping too much: 0 - not at all  Feeling tired or having little energy: 1 - several days  Poor appetite or overeatin - not at all  Feeling bad about yourself - or that you are a failure or have let yourself or your family down: 1 - several days  Trouble concentrating on things, such as reading the newspaper or watching television: 0 - not at all  Moving or speaking so slowly that other people could have noticed. Or the opposite - being so fidgety or restless that you have been moving around a lot more than usual: 0 - not at all       Current ALISSON-7 is .    Suicide/Homicide Risk Assessment:    Risk of Harm to Self:  The following ratings are based on assessment at the time of the interview  Demographic risk factors include: , age: over 50 or older  Historical Risk Factors include: history of depression, history of anxiety  Recent Specific Risk Factors include: none  Protective Factors: no current suicidal ideation, access to mental health treatment, compliant with medications, compliant with mental health treatment, stable living environment, supportive family, supportive friends  Weapons: none and no firearms. The following steps have been taken to ensure  weapons are properly secured: not applicable  Based on today's assessment, Adilia presents the following risk of harm to self: none    Risk of Harm to Others:  The following ratings are based on assessment at the time of the interview  Based on today's assessment, Adilia presents the following risk of harm to others: none    The following interventions are recommended: contracts for safety at present - agrees to go to ED if feeling unsafe, contracts for safety at present - agrees to call Crisis Intervention Service if feeling unsafe    Alcohol/Substance Abuse: Denies        Past Medical History:    Past Medical History:   Diagnosis Date    Hemochromatosis         Past Surgical History:   Procedure Laterality Date    COLONOSCOPY [facility]/ BI[facility]SIES  [facility]One 6mm polyp in ascending colon, ileum normal. Bx: hyperplastic polyp     Allergies   Allergen Reactions    Sulfa Antibiotics Hives and Other (See Comments)     hives    Ciprofloxacin Rash     Other reaction(s): rash  Other reaction(s): rash         Current Medications:       Current Outpatient Medications:     ASHWAGANDHA PO, Take by mouth, Disp: , Rfl:     busPIRone (BUSPAR) 10 mg tablet, TAKE 2 TABLETS BY MOUTH 2 TIMES A DAY., Disp: 360 tablet, Rfl: 1    Cholecalciferol 25 MCG (1000 UT) capsule, Take 5,000 Units by mouth daily, Disp: , Rfl:     DULoxetine (CYMBALTA) 20 mg capsule, Take 2 capsules (40 mg total) by mouth daily, Disp: 180 capsule, Rfl: 1    MAGNESIUM PO, Take by mouth, Disp: , Rfl:     NON FORMULARY, Whole Food Supplements, Disp: , Rfl:     Omega-3 Fatty Acids (FISH OIL PO), Take by mouth, Disp: , Rfl:     Probiotic Product (FORTIFY PROBIOTIC WOMENS PO), Take by mouth, Disp: , Rfl:     TURMERIC PO, Take by mouth, Disp: , Rfl:        History Review: The following portions of the patient's history were reviewed and updated as appropriate: allergies, current medications, past family history, past medical history, past social history, past  surgical history, and problem list.         OBJECTIVE:     Vital signs in last 24 hours:    There were no vitals filed for this visit.    Mental Status Evaluation:    Appearance age appropriate, casually dressed   Behavior cooperative, calm   Speech normal rate, normal volume, normal pitch   Mood normal   Affect normal range and intensity, appropriate   Thought Processes organized, goal directed   Associations intact associations   Thought Content no overt delusions   Perceptual Disturbances: no auditory hallucinations, no visual hallucinations   Abnormal Thoughts  Risk Potential Suicidal ideation - None  Homicidal ideation - None  Potential for aggression - No   Orientation oriented to person, place, time/date, and situation   Memory recent and remote memory grossly intact   Consciousness alert and awake   Attention Span Concentration Span attention span and concentration are age appropriate   Intellect appears to be of average intelligence   Insight intact   Judgement intact   Muscle Strength and  Gait unable to assess today due to virtual visit   Motor activity unable to assess today due to virtual visit   Language no difficulty naming common objects, no difficulty repeating a phrase   Fund of Knowledge adequate knowledge of current events  adequate fund of knowledge regarding past history  adequate fund of knowledge regarding vocabulary    Pain none   Pain Scale 0       Laboratory Results: Most Recent Labs:   Lab Results   Component Value Date    SODIUM 139 06/12/2023    K 4.7 06/12/2023     06/12/2023    CO2 30 06/12/2023    BUN 20 06/12/2023    CREATININE 0.91 06/12/2023    CALCIUM 9.8 06/12/2023    AST 20 06/12/2023    ALT 17 06/12/2023    ALKPHOS 117 06/12/2023    TP 6.6 06/12/2023    TBILI 0.4 06/12/2023     I have personally reviewed all pertinent laboratory/tests results.    Assessment/Plan: Patient overall doing well with depression anxiety well controlled.  Denies any SI or HI.  Major concern has  been chronic Pain.  The plan is to decrease the dose of BuSpar at the patient request from 20 mg 2 times a day to 50 mg 2 times a day for 1 month and then further decreased to 10 mg 2 times a day if no worsening of anxiety.  Her Cymbalta will be continued at this time with no change at 40 mg daily for management of depression and anxiety.  She also was made aware that it can also help with musculoskeletal pain.  Patient educated about her meds again in detail including benefit, risk, side effect, alternative, contraindication, dosage and frequency.  The patient was advised to call us if there is any concern and to call crisis, 911 or visit nearby ER in case of any emergency or having any SI or HI.  The patient verbalized understanding and agrees with the plan.  She will follow up with me in 6 months or sooner if needed.      Diagnoses and all orders for this visit:    Generalized anxiety disorder with panic attacks    MDD (major depressive disorder), recurrent, in partial remission (HCC)          Treatment Recommendations/Precautions:      Aware of 24 hour and weekend coverage for urgent situations accessed by calling [facility] main practice number    Risks/Benefits      Risks, Benefits And Possible Side Effects Of Medications:    Risks, benefits, and possible side effects of medications explained to Adilia and she verbalizes understanding and agreement for treatment.    Controlled Medication Discussion:     Not applicable    Psychotherapy Provided:     Individual psychotherapy provided: Medications, treatment progress and treatment plan reviewed with Adilia.  Medication changes discussed with Adilia.  Medication education provided to Adilia.  Reassurance and supportive therapy provided.   Crisis/safety plan discussed with Adilia.     Treatment Plan;    Completed and signed during the session: Yes - Treatment Plan done but not signed at time of office visit due to:  Plan reviewed by video and verbal consent  given due to virtual visit.        Gabo Gorman MD 05/15/24

## 2024-06-29 DIAGNOSIS — F33.41 MDD (MAJOR DEPRESSIVE DISORDER), RECURRENT, IN PARTIAL REMISSION (HCC): ICD-10-CM

## 2024-06-29 DIAGNOSIS — F41.0 GENERALIZED ANXIETY DISORDER WITH PANIC ATTACKS: ICD-10-CM

## 2024-06-29 DIAGNOSIS — F41.1 GENERALIZED ANXIETY DISORDER WITH PANIC ATTACKS: ICD-10-CM

## 2024-07-01 RX ORDER — DULOXETIN HYDROCHLORIDE 20 MG/1
40 CAPSULE, DELAYED RELEASE ORAL DAILY
Qty: 180 CAPSULE | Refills: 1 | Status: SHIPPED | OUTPATIENT
Start: 2024-07-01

## 2024-11-02 DIAGNOSIS — F41.0 GENERALIZED ANXIETY DISORDER WITH PANIC ATTACKS: ICD-10-CM

## 2024-11-02 DIAGNOSIS — F41.1 GENERALIZED ANXIETY DISORDER WITH PANIC ATTACKS: ICD-10-CM

## 2024-11-04 RX ORDER — BUSPIRONE HYDROCHLORIDE 10 MG/1
20 TABLET ORAL 2 TIMES DAILY
Qty: 360 TABLET | Refills: 0 | Status: SHIPPED | OUTPATIENT
Start: 2024-11-04

## 2024-11-13 ENCOUNTER — TELEMEDICINE (OUTPATIENT)
Dept: PSYCHIATRY | Facility: CLINIC | Age: 69
End: 2024-11-13
Payer: COMMERCIAL

## 2024-11-13 DIAGNOSIS — F41.1 GENERALIZED ANXIETY DISORDER WITH PANIC ATTACKS: ICD-10-CM

## 2024-11-13 DIAGNOSIS — F33.41 MDD (MAJOR DEPRESSIVE DISORDER), RECURRENT, IN PARTIAL REMISSION (HCC): ICD-10-CM

## 2024-11-13 DIAGNOSIS — E87.1 HYPONATREMIA: Primary | ICD-10-CM

## 2024-11-13 DIAGNOSIS — F41.0 GENERALIZED ANXIETY DISORDER WITH PANIC ATTACKS: ICD-10-CM

## 2024-11-13 PROCEDURE — 99214 OFFICE O/P EST MOD 30 MIN: CPT | Performed by: PSYCHIATRY & NEUROLOGY

## 2024-11-13 NOTE — PSYCH
Virtual Regular Visit    Verification of patient location:    Patient is located at Home in the following state in which I hold an active license [place]      Assessment/Plan:    Problem List Items Addressed This Visit       Generalized anxiety disorder with panic attacks     Other Visit Diagnoses         Hyponatremia    -  Primary    Relevant Orders    Comprehensive metabolic panel      MDD (major depressive disorder), recurrent, in partial remission (HCC)        Relevant Orders    CBC and differential    Comprehensive metabolic panel            Goals addressed in session: Goal 1 and Goal 2          Reason for visit is   Chief Complaint   Patient presents with    Virtual Regular Visit          Encounter provider Gabo Gorman MD      Recent Visits  No visits were found meeting these conditions.  Showing recent visits within past 7 days and meeting all other requirements  Today's Visits  Date Type Provider Dept   11/13/24 Telemedicine Gabo Gorman MD [facility]   Showing today's visits and meeting all other requirements  Future Appointments  No visits were found meeting these conditions.  Showing future appointments within next 150 days and meeting all other requirements       The patient was identified by name and date of birth. Adilia Geiger was informed that this is a telemedicine visit and that the visit is being conducted throughthe Epic Embedded platform. She agrees to proceed..  My office door was closed. No one else was in the room.  She acknowledged consent and understanding of privacy and security of the video platform. The patient has agreed to participate and understands they can discontinue the visit at any time.    Patient is aware this is a billable service.     Subjective  See below      HPI     Past Medical History:   Diagnosis Date    Hemochromatosis        Past Surgical History:   Procedure Laterality Date    COLONOSCOPY W/ BIOPSIES  09/26/2017    TYLER Lan MD.-One  6mm polyp in ascending colon, ileum normal. Bx: hyperplastic polyp       Current Outpatient Medications   Medication Sig Dispense Refill    ASHWAGANDHA PO Take by mouth      busPIRone (BUSPAR) 10 mg tablet TAKE 2 TABLETS BY MOUTH TWICE A  tablet 0    Cholecalciferol 25 MCG (1000 UT) capsule Take 5,000 Units by mouth daily      DULoxetine (CYMBALTA) 20 mg capsule TAKE 2 CAPSULES BY MOUTH EVERY  capsule 1    MAGNESIUM PO Take by mouth      NON FORMULARY Whole Food Supplements      Omega-3 Fatty Acids (FISH OIL PO) Take by mouth      Probiotic Product (FORTIFY PROBIOTIC WOMENS PO) Take by mouth      TURMERIC PO Take by mouth       No current facility-administered medications for this visit.        Allergies   Allergen Reactions    Sulfa Antibiotics Hives and Other (See Comments)     hives    Ciprofloxacin Rash     Other reaction(s): rash  Other reaction(s): rash         Review of Systems    Video Exam    There were no vitals filed for this visit.    Physical Exam     Visit Time    Visit Start Time: 9 AM  Visit Stop Time: 9:20 AM  Total Visit Duration:  20 minutes    MEDICATION MANAGEMENT NOTE        [facility]      Name and Date of Birth:  Adilia Geiger 69 y.o. 1955 MRN: [id number]    Date of Visit: November 13, 2024      Assessment & Plan  MDD (major depressive disorder), recurrent, in partial remission (HCC)    Generalized anxiety disorder with panic attacks    Hyponatremia        Reason for Visit: Follow-up for medication management    Subjective: The patient reports she overall has been doing well.  She reports underwent left total hip replacement few months back and has been recovering well.  Denies any complication.  Had undergone rehab and physical therapy for it or surgery.  Reports pain has almost resolved.  Reports mood has been good.  Denies any depression since she last saw me.  Reports sleeping little more than usual.  Reports goes to bed  around 10 PM and wakes up around 7 AM but has difficulty getting out of the bed.  Sometimes will sleep up to 8 or 8:30 AM.  Denies any trouble maintaining sleep through the night.  Denies any naps during the day.  Reports appetite has been fine.  Energy level and concentration has been okay.  Reports anxiety also overall has been good and denied any changes since decreasing the dose of BuSpar at last visit.  At this time the patient is currently on 15 mg twice a day.  She  reports that she did not decreased further as recommended and wanted to wait for this appointment.  The patient reports usually don can be tough for her in terms of mood and anxiety and at this time prefers to continue on 15 mg twice a day.  I was agreeable with the plan.  She denies any other concerns.  Denies any other medical issues lately.      Review Of Systems: Negative other than mentioned above      Constitutional negative   ENT negative   Cardiovascular negative   Respiratory negative   Gastrointestinal negative   Genitourinary negative   Musculoskeletal negative   Integumentary negative   Neurological negative   Endocrine negative   Other Symptoms none       Current Rating Scores:     Current PHQ-9   PHQ-2/9 Depression Screening    Little interest or pleasure in doing things: 0 - not at all  Feeling down, depressed, or hopeless: 0 - not at all  Trouble falling or staying asleep, or sleeping too much: 1 - several days  Feeling tired or having little energy: 1 - several days  Poor appetite or overeatin - not at all  Feeling bad about yourself - or that you are a failure or have let yourself or your family down: 0 - not at all  Trouble concentrating on things, such as reading the newspaper or watching television: 0 - not at all  Moving or speaking so slowly that other people could have noticed. Or the opposite - being so fidgety or restless that you have been moving around a lot more than usual: 0 - not at all       Current ALISSON-7 is    ALISSON-7 Flowsheet Screening      Flowsheet Row Most Recent Value   Over the last two weeks, how often have you been bothered by the following problems?     Feeling nervous, anxious, or on edge 1    Not being able to stop or control worrying 0    Worrying too much about different things 1    Trouble relaxing  1    Being so restless that it's hard to sit still 0    Becoming easily annoyed or irritable  0    Feeling afraid as if something awful might happen 0    How difficult have these problems made it for you to do your work, take care of things at home, or get along with other people?  Somewhat difficult    ALISSON Score  3         .  Suicide/Homicide Risk Assessment:     Risk of Harm to Self:  The following ratings are based on assessment at the time of the interview  Demographic risk factors include: , age: over 50 or older  Historical Risk Factors include: history of depression, history of anxiety  Recent Specific Risk Factors include: none  Protective Factors: no current suicidal ideation, access to mental health treatment, compliant with medications, compliant with mental health treatment, stable living environment, supportive family, supportive friends  Weapons: none and no firearms. The following steps have been taken to ensure weapons are properly secured: not applicable  Based on today's assessment, Adilia presents the following risk of harm to self: none     Risk of Harm to Others:  The following ratings are based on assessment at the time of the interview  Based on today's assessment, Adilia presents the following risk of harm to others: none     The following interventions are recommended: contracts for safety at present - agrees to go to ED if feeling unsafe, contracts for safety at present - agrees to call Crisis Intervention Service if feeling unsafe      Alcohol/Substance Abuse: Denies        Past Medical History:    Past Medical History:   Diagnosis Date    Hemochromatosis         Past Surgical  History:   Procedure Laterality Date    COLONOSCOPY W/ BIOPSIES  09/26/2017    TYLER Lan MD.-One 6mm polyp in ascending colon, ileum normal. Bx: hyperplastic polyp     Allergies   Allergen Reactions    Sulfa Antibiotics Hives and Other (See Comments)     hives    Ciprofloxacin Rash     Other reaction(s): rash  Other reaction(s): rash         Current Medications:       Current Outpatient Medications:     ASHWAGANDHA PO, Take by mouth, Disp: , Rfl:     busPIRone (BUSPAR) 10 mg tablet, TAKE 2 TABLETS BY MOUTH TWICE A DAY, Disp: 360 tablet, Rfl: 0    Cholecalciferol 25 MCG (1000 UT) capsule, Take 5,000 Units by mouth daily, Disp: , Rfl:     DULoxetine (CYMBALTA) 20 mg capsule, TAKE 2 CAPSULES BY MOUTH EVERY DAY, Disp: 180 capsule, Rfl: 1    MAGNESIUM PO, Take by mouth, Disp: , Rfl:     NON FORMULARY, Whole Food Supplements, Disp: , Rfl:     Omega-3 Fatty Acids (FISH OIL PO), Take by mouth, Disp: , Rfl:     Probiotic Product (FORTIFY PROBIOTIC WOMENS PO), Take by mouth, Disp: , Rfl:     TURMERIC PO, Take by mouth, Disp: , Rfl:        History Review: The following portions of the patient's history were reviewed and updated as appropriate: allergies, current medications, past family history, past medical history, past social history, past surgical history, and problem list.         OBJECTIVE:     Vital signs in last 24 hours:    There were no vitals filed for this visit.    Mental Status Evaluation:    Appearance age appropriate, casually dressed   Behavior cooperative, calm   Speech normal rate, normal volume, normal pitch   Mood normal   Affect normal range and intensity, appropriate   Thought Processes organized, goal directed   Associations intact associations   Thought Content no overt delusions   Perceptual Disturbances: no auditory hallucinations, no visual hallucinations   Abnormal Thoughts  Risk Potential Suicidal ideation - None  Homicidal ideation - None  Potential for aggression - No   Orientation oriented  to person, place, time/date, and situation   Memory recent and remote memory grossly intact   Consciousness alert and awake   Attention Span Concentration Span attention span and concentration are age appropriate   Intellect appears to be of average intelligence   Insight intact   Judgement intact   Muscle Strength and  Gait unable to assess today due to virtual visit   Motor activity unable to assess today due to virtual visit   Language no difficulty naming common objects, no difficulty repeating a phrase   Fund of Knowledge adequate knowledge of current events  adequate fund of knowledge regarding past history  adequate fund of knowledge regarding vocabulary    Pain none   Pain Scale 0       Laboratory Results: Most Recent Labs:   Lab Results   Component Value Date    HGB 10.1 (L) 06/13/2024    HCT 28.5 (L) 06/13/2024    SODIUM 132 (L) 06/13/2024    K 4.1 06/13/2024     06/13/2024    CO2 24 06/13/2024    BUN 10 06/13/2024    CREATININE 0.58 06/13/2024    CALCIUM 8.4 (L) 06/13/2024    AST 20 05/29/2024    ALT 21 05/29/2024    ALKPHOS 99 05/29/2024    TP 6.6 05/29/2024    TBILI 0.4 05/29/2024     I have personally reviewed all pertinent laboratory/tests results.    Assessment/Plan: The patient overall has been doing well and denies any concerns.  The patient had done the blood work in June this year and her sodium level was 132 significant change from her previous sodium level of 141.  The patient was advised to have CBC and CMP done within next few weeks to make sure the sodium level has not further worsened.  She also was made aware of risk of her psychiatric medication especially Cymbalta in regard to low sodium level.  Was advised in case her sodium level continues to be low we might have to consider decreasing the dose of the medication and I will call her with an update.  At this time the plan is to continue with her current psych meds with no changes.  She was educated about her meds again in detail  and advised to call us if any concern and to call crisis, 911 or visit nearby ER in case of any emergency.  The patient verbalized understanding agrees with the plan.  She will follow up with me in 6  months or sooner if needed.      Diagnoses and all orders for this visit:    Hyponatremia  -     Comprehensive metabolic panel; Future    MDD (major depressive disorder), recurrent, in partial remission (HCC)  -     CBC and differential; Future  -     Comprehensive metabolic panel; Future    Generalized anxiety disorder with panic attacks          Treatment Recommendations/Precautions:      Aware of 24 hour and weekend coverage for urgent situations accessed by calling [facility] [place] main practice number    Risks/Benefits      Risks, Benefits And Possible Side Effects Of Medications:    Risks, benefits, and possible side effects of medications explained to Adilia and she verbalizes understanding and agreement for treatment.    Controlled Medication Discussion:     Not applicable    Psychotherapy Provided:     Individual psychotherapy provided: Medications, treatment progress and treatment plan reviewed with Adilia.  Medication education provided to Adilia.  Reassurance and supportive therapy provided.   Crisis/safety plan discussed with Adilia.     Treatment Plan;    Completed and signed during the session: Yes - Treatment Plan done but not signed at time of office visit due to:  Plan reviewed by video and verbal consent given due to virtual visit.      Gabo Gorman MD 11/13/24

## 2024-11-13 NOTE — PSYCH
Virtual Regular Visit    Verification of patient location:    Patient is located at {[facility] Virtual Patient Location:[address]} in the following state in which I hold an active license {[facility] virtual patient location:[address]}      Assessment/Plan:    Problem List Items Addressed This Visit     Generalized anxiety disorder with panic attacks   Other Visit Diagnoses       MDD (major depressive disorder), recurrent, in partial remission (HCC)              Goals addressed in session: {GOALS:52095}          Reason for visit is   Chief Complaint   Patient presents with   • Virtual Regular Visit          Encounter provider Gabo Gorman MD      Recent Visits  No visits were found meeting these conditions.  Showing recent visits within past 7 days and meeting all other requirements  Today's Visits  Date Type Provider Dept   11/13/24 Telemedicine Gabo Gorman MD [facility]   Showing today's visits and meeting all other requirements  Future Appointments  No visits were found meeting these conditions.  Showing future appointments within next 150 days and meeting all other requirements       The patient was identified by name and date of birth. Adilia Geiger was informed that this is a telemedicine visit and that the visit is being conducted through{[facility] VIRTUAL VISIT MEDIUM:76162}.  {Telemedicine confidentiality :18114} {Telemedicine participants:66183}  She acknowledged consent and understanding of privacy and security of the video platform. The patient has agreed to participate and understands they can discontinue the visit at any time.    Patient is aware this is a billable service.     Subjective  Adilia Geiger is a 69 y.o. female *** .      HPI     Past Medical History:   Diagnosis Date   • Hemochromatosis        Past Surgical History:   Procedure Laterality Date   • COLONOSCOPY W/ BIOPSIES  09/26/2017    TYLER Lan MD.-One 6mm polyp in ascending colon, ileum normal. Bx: hyperplastic polyp        Current Outpatient Medications   Medication Sig Dispense Refill   • ASHWAGANDHA PO Take by mouth     • busPIRone (BUSPAR) 10 mg tablet TAKE 2 TABLETS BY MOUTH TWICE A  tablet 0   • Cholecalciferol 25 MCG (1000 UT) capsule Take 5,000 Units by mouth daily     • DULoxetine (CYMBALTA) 20 mg capsule TAKE 2 CAPSULES BY MOUTH EVERY  capsule 1   • MAGNESIUM PO Take by mouth     • NON FORMULARY Whole Food Supplements     • Omega-3 Fatty Acids (FISH OIL PO) Take by mouth     • Probiotic Product (FORTIFY PROBIOTIC WOMENS PO) Take by mouth     • TURMERIC PO Take by mouth       No current facility-administered medications for this visit.        Allergies   Allergen Reactions   • Sulfa Antibiotics Hives and Other (See Comments)     hives   • Ciprofloxacin Rash     Other reaction(s): rash  Other reaction(s): rash         Review of Systems    Video Exam    There were no vitals filed for this visit.    Physical Exam     Visit Time    Visit Start Time: ***  Visit Stop Time: ***  Total Visit Duration: {Psych Total Visit Time:36030}

## 2024-11-13 NOTE — BH TREATMENT PLAN
TREATMENT PLAN (Medication Management Only)        [facility]    Name and Date of Birth:  Adilia Geiger 69 y.o. 1955  Date of Treatment Plan: November 13, 2024  Diagnosis/Diagnoses:    1. Hyponatremia    2. MDD (major depressive disorder), recurrent, in partial remission (HCC)    3. Generalized anxiety disorder with panic attacks      Strengths/Personal Resources for Self-Care: supportive family, supportive friends, taking medications as prescribed, ability to adapt to life changes, ability to communicate needs, ability to communicate well, ability to listen, ability to reason, ability to understand psychiatric illness, average or above intelligence.  Area/Areas of need (in own words): anxiety, depression  1. Long Term Goal:  Continue to feel better .  Target Date:6 months - 5/13/2025  Person/Persons responsible for completion of goal: Adilia  2.  Short Term Objective (s) - How will we reach this goal?:   A. Provider new recommended medication/dosage changes and/or continue medication(s): continue current medications as prescribed.  B. N/A.  C. N/A.  Target Date:6 months - 5/13/2025  Person/Persons Responsible for Completion of Goal: Adilia  Progress Towards Goals: continuing treatment  Treatment Modality: medication management every 6 months  Review due 180 days from date of this plan: 6 months - 5/13/2025  Expected length of service: ongoing treatment  My Physician/PA/NP and I have developed this plan together and I agree to work on the goals and objectives. I understand the treatment goals that were developed for my treatment.

## 2024-11-14 ENCOUNTER — APPOINTMENT (OUTPATIENT)
Dept: LAB | Facility: CLINIC | Age: 69
End: 2024-11-14
Payer: MEDICARE

## 2024-11-14 DIAGNOSIS — E87.1 HYPONATREMIA: ICD-10-CM

## 2024-11-14 DIAGNOSIS — F33.41 MDD (MAJOR DEPRESSIVE DISORDER), RECURRENT, IN PARTIAL REMISSION (HCC): ICD-10-CM

## 2024-11-14 LAB
ALBUMIN SERPL BCG-MCNC: 4.1 G/DL (ref 3.5–5)
ALP SERPL-CCNC: 105 U/L (ref 34–104)
ALT SERPL W P-5'-P-CCNC: 17 U/L (ref 7–52)
ANION GAP SERPL CALCULATED.3IONS-SCNC: 8 MMOL/L (ref 4–13)
AST SERPL W P-5'-P-CCNC: 21 U/L (ref 13–39)
BASOPHILS # BLD AUTO: 0.04 THOUSANDS/ÂΜL (ref 0–0.1)
BASOPHILS NFR BLD AUTO: 1 % (ref 0–1)
BILIRUB SERPL-MCNC: 0.47 MG/DL (ref 0.2–1)
BUN SERPL-MCNC: 29 MG/DL (ref 5–25)
CALCIUM SERPL-MCNC: 9.3 MG/DL (ref 8.4–10.2)
CHLORIDE SERPL-SCNC: 103 MMOL/L (ref 96–108)
CO2 SERPL-SCNC: 29 MMOL/L (ref 21–32)
CREAT SERPL-MCNC: 0.81 MG/DL (ref 0.6–1.3)
EOSINOPHIL # BLD AUTO: 0.12 THOUSAND/ÂΜL (ref 0–0.61)
EOSINOPHIL NFR BLD AUTO: 3 % (ref 0–6)
ERYTHROCYTE [DISTWIDTH] IN BLOOD BY AUTOMATED COUNT: 12.3 % (ref 11.6–15.1)
GFR SERPL CREATININE-BSD FRML MDRD: 74 ML/MIN/1.73SQ M
GLUCOSE P FAST SERPL-MCNC: 86 MG/DL (ref 65–99)
HCT VFR BLD AUTO: 40 % (ref 34.8–46.1)
HGB BLD-MCNC: 13.2 G/DL (ref 11.5–15.4)
IMM GRANULOCYTES # BLD AUTO: 0 THOUSAND/UL (ref 0–0.2)
IMM GRANULOCYTES NFR BLD AUTO: 0 % (ref 0–2)
LYMPHOCYTES # BLD AUTO: 1.52 THOUSANDS/ÂΜL (ref 0.6–4.47)
LYMPHOCYTES NFR BLD AUTO: 36 % (ref 14–44)
MCH RBC QN AUTO: 34.1 PG (ref 26.8–34.3)
MCHC RBC AUTO-ENTMCNC: 33 G/DL (ref 31.4–37.4)
MCV RBC AUTO: 103 FL (ref 82–98)
MONOCYTES # BLD AUTO: 0.4 THOUSAND/ÂΜL (ref 0.17–1.22)
MONOCYTES NFR BLD AUTO: 10 % (ref 4–12)
NEUTROPHILS # BLD AUTO: 2.1 THOUSANDS/ÂΜL (ref 1.85–7.62)
NEUTS SEG NFR BLD AUTO: 50 % (ref 43–75)
NRBC BLD AUTO-RTO: 0 /100 WBCS
PLATELET # BLD AUTO: 249 THOUSANDS/UL (ref 149–390)
PMV BLD AUTO: 10.8 FL (ref 8.9–12.7)
POTASSIUM SERPL-SCNC: 4.4 MMOL/L (ref 3.5–5.3)
PROT SERPL-MCNC: 6.6 G/DL (ref 6.4–8.4)
RBC # BLD AUTO: 3.87 MILLION/UL (ref 3.81–5.12)
SODIUM SERPL-SCNC: 140 MMOL/L (ref 135–147)
WBC # BLD AUTO: 4.18 THOUSAND/UL (ref 4.31–10.16)

## 2024-11-14 PROCEDURE — 80053 COMPREHEN METABOLIC PANEL: CPT

## 2024-11-14 PROCEDURE — 36415 COLL VENOUS BLD VENIPUNCTURE: CPT

## 2024-11-14 PROCEDURE — 85025 COMPLETE CBC W/AUTO DIFF WBC: CPT

## 2024-11-15 ENCOUNTER — TELEPHONE (OUTPATIENT)
Dept: PSYCHIATRY | Facility: CLINIC | Age: 69
End: 2024-11-15

## 2024-11-15 NOTE — TELEPHONE ENCOUNTER
Received a message from Dr. Gorman asking nursing to reach out in regards to Adilia's most recent blood work. I reviewed that her labs are mostly within normal range except WBC and alkaline phosphatase but nothing significant and will continue to monitor at this time. She was in agreement and had no questions.

## 2024-12-27 DIAGNOSIS — F33.41 MDD (MAJOR DEPRESSIVE DISORDER), RECURRENT, IN PARTIAL REMISSION (HCC): ICD-10-CM

## 2024-12-27 DIAGNOSIS — F41.0 GENERALIZED ANXIETY DISORDER WITH PANIC ATTACKS: ICD-10-CM

## 2024-12-27 DIAGNOSIS — F41.1 GENERALIZED ANXIETY DISORDER WITH PANIC ATTACKS: ICD-10-CM

## 2024-12-27 RX ORDER — DULOXETIN HYDROCHLORIDE 20 MG/1
40 CAPSULE, DELAYED RELEASE ORAL DAILY
Qty: 180 CAPSULE | Refills: 0 | Status: SHIPPED | OUTPATIENT
Start: 2024-12-27

## 2025-02-02 DIAGNOSIS — F41.1 GENERALIZED ANXIETY DISORDER WITH PANIC ATTACKS: ICD-10-CM

## 2025-02-02 DIAGNOSIS — F41.0 GENERALIZED ANXIETY DISORDER WITH PANIC ATTACKS: ICD-10-CM

## 2025-02-03 RX ORDER — BUSPIRONE HYDROCHLORIDE 10 MG/1
20 TABLET ORAL 2 TIMES DAILY
Qty: 360 TABLET | Refills: 0 | Status: SHIPPED | OUTPATIENT
Start: 2025-02-03

## 2025-03-29 DIAGNOSIS — F41.1 GENERALIZED ANXIETY DISORDER WITH PANIC ATTACKS: ICD-10-CM

## 2025-03-29 DIAGNOSIS — F41.0 GENERALIZED ANXIETY DISORDER WITH PANIC ATTACKS: ICD-10-CM

## 2025-03-29 DIAGNOSIS — F33.41 MDD (MAJOR DEPRESSIVE DISORDER), RECURRENT, IN PARTIAL REMISSION (HCC): ICD-10-CM

## 2025-03-31 RX ORDER — DULOXETIN HYDROCHLORIDE 20 MG/1
40 CAPSULE, DELAYED RELEASE ORAL DAILY
Qty: 180 CAPSULE | Refills: 0 | Status: SHIPPED | OUTPATIENT
Start: 2025-03-31

## 2025-05-13 ENCOUNTER — TELEMEDICINE (OUTPATIENT)
Dept: PSYCHIATRY | Facility: CLINIC | Age: 70
End: 2025-05-13
Payer: MEDICARE

## 2025-05-13 DIAGNOSIS — F41.0 GENERALIZED ANXIETY DISORDER WITH PANIC ATTACKS: ICD-10-CM

## 2025-05-13 DIAGNOSIS — F33.42 MDD (MAJOR DEPRESSIVE DISORDER), RECURRENT, IN FULL REMISSION (HCC): ICD-10-CM

## 2025-05-13 DIAGNOSIS — F41.1 GENERALIZED ANXIETY DISORDER WITH PANIC ATTACKS: ICD-10-CM

## 2025-05-13 PROCEDURE — 99214 OFFICE O/P EST MOD 30 MIN: CPT | Performed by: PSYCHIATRY & NEUROLOGY

## 2025-05-13 RX ORDER — DULOXETIN HYDROCHLORIDE 20 MG/1
40 CAPSULE, DELAYED RELEASE ORAL DAILY
Qty: 180 CAPSULE | Refills: 0 | Status: SHIPPED | OUTPATIENT
Start: 2025-05-13

## 2025-05-13 RX ORDER — BUSPIRONE HYDROCHLORIDE 10 MG/1
10 TABLET ORAL 2 TIMES DAILY
Qty: 180 TABLET | Refills: 1 | Status: SHIPPED | OUTPATIENT
Start: 2025-05-13 | End: 2025-11-09

## 2025-05-13 NOTE — ASSESSMENT & PLAN NOTE
Orders:    DULoxetine (CYMBALTA) 20 mg capsule; Take 2 capsules (40 mg total) by mouth daily    busPIRone (BUSPAR) 10 mg tablet; Take 1 tablet (10 mg total) by mouth 2 (two) times a day

## 2025-05-13 NOTE — PSYCH
MEDICATION MANAGEMENT NOTE    Name: Adilia Geiger      : 1955      MRN: [id number]  Encounter Provider: Gabo Gorman MD  Encounter Date: 2025   Encounter department: [facility]    Insurance: Payor: MEDICARE / Plan: MEDICARE A AND B / Product Type: Medicare A & B Fee for Service /      Reason for Visit:   Chief Complaint   Patient presents with    Virtual Regular Visit     :Administrative Statements   Encounter provider Gabo Gorman MD    The Patient is located at Home and in the following state in which I hold an active license [place].    The patient was identified by name and date of birth. Adilia Geiger was informed that this is a telemedicine visit and that the visit is being conducted through the Epic Embedded platform. She agrees to proceed..  My office door was closed. No one else was in the room.  She acknowledged consent and understanding of privacy and security of the video platform. The patient has agreed to participate and understands they can discontinue the visit at any time.    I have spent a total time of 20 minutes in caring for this patient on the day of the visit/encounter including Risks and benefits of tx options, Instructions for management, Patient and family education, Importance of tx compliance, Risk factor reductions, Impressions, Counseling / Coordination of care, Reviewing/placing orders in the medical record (including tests, medications, and/or procedures), and Obtaining or reviewing history  , not including the time spent for establishing the audio/video connection.   Assessment & Plan  MDD (major depressive disorder), recurrent, in full remission (HCC)    Orders:    DULoxetine (CYMBALTA) 20 mg capsule; Take 2 capsules (40 mg total) by mouth daily    Generalized anxiety disorder with panic attacks    Orders:    DULoxetine (CYMBALTA) 20 mg capsule; Take 2 capsules (40 mg total) by mouth daily    busPIRone (BUSPAR) 10 mg tablet; Take 1  tablet (10 mg total) by mouth 2 (two) times a day    MDD (major depressive disorder), recurrent, in full remission (HCC)         Generalized anxiety disorder with panic attacks           The patient overall has been doing very well and denying any concern except for having difficulty getting out of the bed in the morning due to sedation.  The cause is not clear but  does not seem the medication being the reason.  She also denies any medical issues.  Plan at this time is to continue with the current psychiatric medications with no changes.  Discussed with the patient that we can review her psychiatric medication again in 6 to 12 months and if needed we can decrease the dose further.  Her sodium level which was checked 6 months back came back to normal range.  She said she has another routine blood work coming up in few months.  Patient educated about the meds again in detail and advised to call us if any concern and to call [facility], 911 or visit nearby ER in case of any emergency.  Patient verbalized understanding and agrees with the plan.    Treatment Recommendations:    Educated about diagnosis and treatment modalities. Verbalizes understanding and agreement with the treatment plan.  Discussed self monitoring of symptoms, and symptom monitoring tools.  Discussed medications and if treatment adjustment was needed or desired.  Aware of 24 hour and weekend coverage for urgent situations accessed by calling [facility] main practice number  I am scheduling this patient out for greater than 3 months: Yes - Patient's stability of symptoms warrant this length of time or no significant changes to treatment plan    Medications Risks/Benefits:      Risks, Benefits And Possible Side Effects Of Medications:    Risks, benefits, and possible side effects of medications explained to Adilia and she (or legal representative) verbalizes understanding and agreement for treatment.    Controlled Medication Discussion:      Not applicable      History of Present Illness     The patient reports she overall has been doing well.  Reports mood has been stable and denies feeling depressed or anxious over the last 6 months.  Reports sleep has been still too much.  Reports goes to bed around 10 and fall asleep by 10:30 PM.  Reports likes to read before going to sleep.  She reports she wakes up around 7 AM but has difficulty getting out of the bed and feels sleepy.  She is not taking any psychiatric medication or any other medication in general which has been sedation as major side effect.  She stated that she has self decreased the dose of BuSpar to 10 mg twice a day after her last visit but she still has difficulty waking up in the morning.  She reports she does very well throughout the day.  Denies feeling tired or sedated.  Denies dozing off or taking nap during the daytime.  Reports appetite has been good.  Concentration has been fine.  Yayo has been swimming regularly and it has been helpful for her mental health too.  She also takes other supplements as mentioned below under her current medications.  She  denies any hopelessness or having any suicidal thoughts.  Compliant with medication and denies any other concerns other than mentioned above.    Review Of Systems: A review of systems is obtained and is negative except for the pertinent positives listed in HPI/Subjective above.      Current Rating Scores:     Current PHQ-9   PHQ-2/9 Depression Screening    Little interest or pleasure in doing things: 0 - not at all  Feeling down, depressed, or hopeless: 0 - not at all  Trouble falling or staying asleep, or sleeping too much: 1 - several days  Feeling tired or having little energy: 1 - several days  Poor appetite or overeatin - not at all  Feeling bad about yourself - or that you are a failure or have let yourself or your family down: 1 - several days  Trouble concentrating on things, such as reading the newspaper or watching  television: 0 - not at all  Moving or speaking so slowly that other people could have noticed. Or the opposite - being so fidgety or restless that you have been moving around a lot more than usual: 0 - not at all       Current ALISSON-7   ALISSON-7 Flowsheet Screening      Flowsheet Row Most Recent Value   Over the last two weeks, how often have you been bothered by the following problems?     Feeling nervous, anxious, or on edge 0    Not being able to stop or control worrying 0    Worrying too much about different things 0    Trouble relaxing  0    Being so restless that it's hard to sit still 0    Becoming easily annoyed or irritable  0    Feeling afraid as if something awful might happen 1    How difficult have these problems made it for you to do your work, take care of things at home, or get along with other people?  Not difficult at all    ALISSON Score  1             Areas of Improvement: reviewed in HPI/Subjective Section and reviewed in Assessment and Plan Section      Past Medical History:   Diagnosis Date    Hemochromatosis      Past Surgical History:   Procedure Laterality Date    COLONOSCOPY W/ BIOPSIES  09/26/2017    TYLER Lan MD.-One 6mm polyp in ascending colon, ileum normal. Bx: hyperplastic polyp     Allergies:   Allergies   Allergen Reactions    Sulfa Antibiotics Hives and Other (See Comments)     hives    Ciprofloxacin Rash     Other reaction(s): rash  Other reaction(s): rash         Current Outpatient Medications   Medication Instructions    ASHWAGANDHA PO Take by mouth    busPIRone (BUSPAR) 10 mg, Oral, 2 times daily    Cholecalciferol 5,000 Units, Daily    DULoxetine (CYMBALTA) 40 mg, Oral, Daily    MAGNESIUM PO Take by mouth    NON FORMULARY Whole Food Supplements    Omega-3 Fatty Acids (FISH OIL PO) Take by mouth    Probiotic Product (FORTIFY PROBIOTIC WOMENS PO) Oral    TURMERIC PO Take by mouth        Substance Abuse History:    Tobacco, Alcohol and Drug Use History     Tobacco Use    Smoking  status: Never    Smokeless tobacco: Never   Substance Use Topics    Alcohol use: Yes     Alcohol/week: 1.0 - 2.0 standard drink of alcohol     Types: 1 - 2 Standard drinks or equivalent per week    Drug use: Not on file          Social History:    Social History     Socioeconomic History    Marital status: /Civil Union     Spouse name: Not on file    Number of children: Not on file    Years of education: Not on file    Highest education level: Not on file   Occupational History    Occupation: Data Maintance   Other Topics Concern    Not on file   Social History Narrative    Not on file        Family Psychiatric History:     Family History   Problem Relation Age of Onset    Breast cancer Mother     Skin cancer Mother     Liver cancer Father     Melanoma Sister     Colon polyps Brother     Down syndrome Son         Liver Failure    Colon cancer Maternal Uncle        Medical History Reviewed by provider this encounter:  Tobacco  Allergies  Meds  Problems  Med Hx  Surg Hx  Fam Hx          Objective   There were no vitals taken for this visit.     Mental Status Evaluation:    Appearance age appropriate, casually dressed   Behavior cooperative, calm   Speech normal rate, normal volume, normal pitch, spontaneous   Mood euthymic   Affect normal range and intensity, appropriate   Thought Processes organized, goal directed   Thought Content no overt delusions   Perceptual Disturbances: no auditory hallucinations, no visual hallucinations   Abnormal Thoughts  Risk Potential Suicidal ideation - None  Homicidal ideation - None  Potential for aggression - No   Orientation oriented to person, place, time/date, and situation   Memory recent and remote memory grossly intact   Consciousness alert and awake   Attention Span Concentration Span attention span and concentration are age appropriate   Intellect appears to be of average intelligence   Insight intact   Judgement intact   Muscle Strength and  Gait unable to  assess today due to virtual visit   Motor activity unable to assess today due to virtual visit   Language no difficulty naming common objects, no difficulty repeating a phrase   Fund of Knowledge adequate knowledge of current events  adequate fund of knowledge regarding past history  adequate fund of knowledge regarding vocabulary        Laboratory Results: I have personally reviewed all pertinent laboratory/tests results    Most Recent Labs:   Lab Results   Component Value Date    WBC 4.18 (L) 11/14/2024    RBC 3.87 11/14/2024    HGB 13.2 11/14/2024    HCT 40.0 11/14/2024     11/14/2024    RDW 12.3 11/14/2024    NEUTROABS 2.10 11/14/2024    SODIUM 140 11/14/2024    K 4.4 11/14/2024     11/14/2024    CO2 29 11/14/2024    BUN 29 (H) 11/14/2024    CREATININE 0.81 11/14/2024    CALCIUM 9.3 11/14/2024    AST 21 11/14/2024    ALT 17 11/14/2024    ALKPHOS 105 (H) 11/14/2024    TP 6.6 11/14/2024    TBILI 0.47 11/14/2024       Suicide/Homicide Risk Assessment:     Risk of Harm to Self:  The following ratings are based on assessment at the time of the interview  Demographic risk factors include: , age: over 50 or older  Historical Risk Factors include: history of depression, history of anxiety  Recent Specific Risk Factors include: none  Protective Factors: no current suicidal ideation, access to mental health treatment, compliant with medications, compliant with mental health treatment, stable living environment, supportive family, supportive friends  Weapons: none and no firearms. The following steps have been taken to ensure weapons are properly secured: not applicable  Based on today's assessment, Adilia presents the following risk of harm to self: none     Risk of Harm to Others:  The following ratings are based on assessment at the time of the interview  Based on today's assessment, Adilia presents the following risk of harm to others: none     The following interventions are recommended: contracts for  "safety at present - agrees to go to ED if feeling unsafe, contracts for safety at present - agrees to call Crisis Intervention Service if feeling unsafe       Psychotherapy Provided:     Individual psychotherapy provided: Yes Medications, treatment progress and treatment plan reviewed with Adilia.  Medication education provided to Adilia.  Reassurance and supportive therapy provided.   Crisis/safety plan discussed with Adilia.    Treatment Plan:    Completed and signed during the session: Yes - Treatment Plan done but not signed at time of office visit due to: Plan reviewed by video and verbal consent given due to virtual visit.    Goals: Progress towards Treatment Plan goals - Yes, progressing, as evidenced by subjective findings in HPI/Subjective Section and in Assessment and Plan Section    Depression Follow-up Plan Completed: No    Note Share:    This note was shared with patient.          Visit Time  Visit Start Time: 9:03 AM  Visit Stop Time: 9:23 AM  Total Visit Duration:  20 minutes    Portions of the record may have been created with voice recognition software. Occasional wrong word or \"sound a like\" substitutions may have occurred due to the inherent limitations of voice recognition software. Read the chart carefully and recognize, using context, where substitutions have occurred.    Gabo Gorman MD 05/13/25  "

## 2025-05-13 NOTE — BH TREATMENT PLAN
TREATMENT PLAN (Medication Management Only)        [facility]    Name and Date of Birth:  Adilia Geiger 69 y.o. 1955  MRN: [id number]  Date of Treatment Plan: May 13, 2025  Diagnosis/Diagnoses:    1. MDD (major depressive disorder), recurrent, in full remission (HCC)    2. Generalized anxiety disorder with panic attacks      Strengths/Personal Resources for Self-Care: supportive family, supportive friends, taking medications as prescribed, ability to adapt to life changes, ability to communicate needs, ability to communicate well, ability to listen, ability to reason, ability to understand psychiatric illness, average or above intelligence, family ties.  Area/Areas of need (in own words): anxiety, depression  1. Long Term Goal:   Feel happier.  Target Date:6 months - 11/13/2025  Person/Persons responsible for completion of goal: Adilia  2.  Short Term Objective (s) - How will we reach this goal?:   A.  Provider new recommended medication/dosage changes and/or continue medication(s): continue current medications as prescribed.  B.  N/A.  C.  N/A.  Target Date:6 months - 11/13/2025  Person/Persons Responsible for Completion of Goal: Adilia  Progress Towards Goals: continuing treatment  Treatment Modality: medication management every 6 months  Review due 180 days from date of this plan: 6 months - 11/13/2025  Expected length of service: ongoing treatment unless revised  My Physician/PA/NP and I have developed this plan together and I agree to work on the goals and objectives. I understand the treatment goals that were developed for my treatment.   Electronic Signatures: on file (unless signed below)    Gabo Gorman MD 05/13/25